# Patient Record
Sex: FEMALE | Race: WHITE | URBAN - METROPOLITAN AREA
[De-identification: names, ages, dates, MRNs, and addresses within clinical notes are randomized per-mention and may not be internally consistent; named-entity substitution may affect disease eponyms.]

---

## 2017-11-14 ENCOUNTER — OFFICE VISIT (OUTPATIENT)
Dept: OBGYN CLINIC | Facility: CLINIC | Age: 37
End: 2017-11-14

## 2017-11-14 VITALS
BODY MASS INDEX: 26 KG/M2 | DIASTOLIC BLOOD PRESSURE: 76 MMHG | SYSTOLIC BLOOD PRESSURE: 113 MMHG | HEART RATE: 58 BPM | WEIGHT: 179 LBS

## 2017-11-14 DIAGNOSIS — Z01.419 ENCOUNTER FOR GYNECOLOGICAL EXAMINATION: Primary | ICD-10-CM

## 2017-11-14 DIAGNOSIS — Z32.00 PREGNANCY EXAMINATION OR TEST, PREGNANCY UNCONFIRMED: ICD-10-CM

## 2017-11-14 PROCEDURE — 99395 PREV VISIT EST AGE 18-39: CPT | Performed by: OBSTETRICS & GYNECOLOGY

## 2017-11-16 NOTE — PROGRESS NOTES
Reva Garza is a 40year old female  No LMP recorded. Patient is not currently having periods (Reason: IUD - Intrauterine Device). here for annual exam.       MAF pt. Last pap 2016 normal with neg HPV.     Had Mirena IUD placed in 10/201 numbness. Psychiatric: denies depression or anxiety. Endocrine:   denies excessive thirst or urination. Heme/Lymph:  easy bruising or bleeding.     PHYSICAL EXAM:   /76   Pulse 58   Wt 179 lb (81.2 kg)   BMI 26.24 kg/m²   Wt Readings from Last 2 En

## 2017-11-27 ENCOUNTER — APPOINTMENT (OUTPATIENT)
Dept: LAB | Age: 37
End: 2017-11-27
Attending: OBSTETRICS & GYNECOLOGY
Payer: COMMERCIAL

## 2017-11-27 DIAGNOSIS — Z32.00 PREGNANCY EXAMINATION OR TEST, PREGNANCY UNCONFIRMED: ICD-10-CM

## 2017-11-27 PROCEDURE — 84703 CHORIONIC GONADOTROPIN ASSAY: CPT

## 2017-11-27 PROCEDURE — 36415 COLL VENOUS BLD VENIPUNCTURE: CPT

## 2017-12-01 ENCOUNTER — OFFICE VISIT (OUTPATIENT)
Dept: OBGYN CLINIC | Facility: CLINIC | Age: 37
End: 2017-12-01

## 2017-12-01 VITALS
WEIGHT: 179.19 LBS | SYSTOLIC BLOOD PRESSURE: 118 MMHG | BODY MASS INDEX: 26 KG/M2 | HEART RATE: 75 BPM | DIASTOLIC BLOOD PRESSURE: 71 MMHG

## 2017-12-01 DIAGNOSIS — Z30.433 ENCOUNTER FOR REMOVAL AND REINSERTION OF INTRAUTERINE CONTRACEPTIVE DEVICE: Primary | ICD-10-CM

## 2017-12-01 PROCEDURE — 58301 REMOVE INTRAUTERINE DEVICE: CPT | Performed by: OBSTETRICS & GYNECOLOGY

## 2017-12-01 PROCEDURE — 58300 INSERT INTRAUTERINE DEVICE: CPT | Performed by: OBSTETRICS & GYNECOLOGY

## 2021-01-11 ENCOUNTER — WALK IN (OUTPATIENT)
Dept: URGENT CARE | Age: 41
End: 2021-01-11

## 2021-01-11 ENCOUNTER — TELEPHONE (OUTPATIENT)
Dept: SCHEDULING | Age: 41
End: 2021-01-11

## 2021-01-11 VITALS
OXYGEN SATURATION: 100 % | SYSTOLIC BLOOD PRESSURE: 92 MMHG | WEIGHT: 175 LBS | RESPIRATION RATE: 16 BRPM | HEART RATE: 90 BPM | DIASTOLIC BLOOD PRESSURE: 60 MMHG | TEMPERATURE: 98.4 F | BODY MASS INDEX: 25.84 KG/M2

## 2021-01-11 DIAGNOSIS — L03.211 CELLULITIS OF FACE: Primary | ICD-10-CM

## 2021-01-11 PROCEDURE — 99203 OFFICE O/P NEW LOW 30 MIN: CPT | Performed by: NURSE PRACTITIONER

## 2021-01-11 RX ORDER — CEPHALEXIN 500 MG/1
500 CAPSULE ORAL 2 TIMES DAILY
Qty: 20 CAPSULE | Refills: 0 | Status: SHIPPED | OUTPATIENT
Start: 2021-01-11 | End: 2021-01-21

## 2021-01-11 RX ORDER — NEOMYCIN SULFATE, POLYMYXIN B SULFATE AND BACITRACIN ZINC 3.5; 10000; 4 MG/G; [USP'U]/G; [USP'U]/G
OINTMENT OPHTHALMIC
Qty: 3.5 G | Refills: 0 | Status: SHIPPED | OUTPATIENT
Start: 2021-01-11

## 2021-01-11 ASSESSMENT — ENCOUNTER SYMPTOMS: COLOR CHANGE: 1

## 2021-07-05 NOTE — PROCEDURES
IUD Removal     Consent signed. Procedure discussed with the patient in detail including indication, risks, benefits, alternatives and complications.     Pelvic Exam Findings:  Lesion description:  IUD strings seen from cervix    Procedure:  Speculum alberto No

## 2022-01-25 ENCOUNTER — OFFICE VISIT (OUTPATIENT)
Dept: OBGYN CLINIC | Facility: CLINIC | Age: 42
End: 2022-01-25
Payer: COMMERCIAL

## 2022-01-25 VITALS
SYSTOLIC BLOOD PRESSURE: 128 MMHG | WEIGHT: 191.63 LBS | HEIGHT: 69 IN | BODY MASS INDEX: 28.38 KG/M2 | HEART RATE: 80 BPM | DIASTOLIC BLOOD PRESSURE: 82 MMHG

## 2022-01-25 DIAGNOSIS — Z01.419 WELL WOMAN EXAM: Primary | ICD-10-CM

## 2022-01-25 DIAGNOSIS — Z12.4 SCREENING FOR MALIGNANT NEOPLASM OF CERVIX: ICD-10-CM

## 2022-01-25 DIAGNOSIS — Z12.31 SCREENING MAMMOGRAM, ENCOUNTER FOR: ICD-10-CM

## 2022-01-25 PROCEDURE — 3079F DIAST BP 80-89 MM HG: CPT | Performed by: OBSTETRICS & GYNECOLOGY

## 2022-01-25 PROCEDURE — 99386 PREV VISIT NEW AGE 40-64: CPT | Performed by: OBSTETRICS & GYNECOLOGY

## 2022-01-25 PROCEDURE — 3074F SYST BP LT 130 MM HG: CPT | Performed by: OBSTETRICS & GYNECOLOGY

## 2022-01-25 PROCEDURE — 3008F BODY MASS INDEX DOCD: CPT | Performed by: OBSTETRICS & GYNECOLOGY

## 2022-01-25 RX ORDER — NEOMYCIN SULFATE, POLYMYXIN B SULFATE AND BACITRACIN ZINC 3.5; 10000; 4 MG/G; [USP'U]/G; [USP'U]/G
OINTMENT OPHTHALMIC
COMMUNITY
Start: 2021-01-11

## 2022-01-25 NOTE — H&P
HPI:  The patient is a 38 yo F here for [employer]. Has mirena x 4 years. No menses. /monogamous. LPS:  6/23/16 pap/hpv neg      Reviewed medical and surgical history below     No LMP recorded. (Menstrual status: IUD - Intrauterine Device). Self-Exams: Not Asked    Social History Narrative      Not on file    Social Determinants of Health  Financial Resource Strain: Not on file  Food Insecurity: Not on file  Transportation Needs: Not on file  Physical Activity: Not on file  Stress: Not on fi no unusual rashes or bruises  Extremities: no edema, no cyanosis  Psychiatric: Appropriate mood and affect    Pelvic Exam:  External Genitalia: normal appearance, hair distribution, and no lesions  Urethral Meatus:  normal in size, location, without lesion

## 2022-01-26 LAB — HPV I/H RISK 1 DNA SPEC QL NAA+PROBE: NEGATIVE

## 2022-02-12 ENCOUNTER — HOSPITAL ENCOUNTER (OUTPATIENT)
Dept: MAMMOGRAPHY | Age: 42
Discharge: HOME | End: 2022-02-12
Attending: OBSTETRICS & GYNECOLOGY
Payer: COMMERCIAL

## 2022-02-12 DIAGNOSIS — Z12.31 SCREENING MAMMOGRAM, ENCOUNTER FOR: ICD-10-CM

## 2022-02-12 PROCEDURE — 77063 BREAST TOMOSYNTHESIS BI: CPT | Performed by: OBSTETRICS & GYNECOLOGY

## 2022-02-12 PROCEDURE — 77067 SCR MAMMO BI INCL CAD: CPT | Performed by: OBSTETRICS & GYNECOLOGY

## 2022-02-14 ENCOUNTER — TELEPHONE (OUTPATIENT)
Dept: OBGYN CLINIC | Facility: CLINIC | Age: 42
End: 2022-02-14

## 2022-02-24 ENCOUNTER — HOSPITAL ENCOUNTER (OUTPATIENT)
Dept: MAMMOGRAPHY | Facility: HOSPITAL | Age: 42
Discharge: HOME | End: 2022-02-24
Attending: OBSTETRICS & GYNECOLOGY
Payer: COMMERCIAL

## 2022-02-24 DIAGNOSIS — R92.8 ABNORMAL MAMMOGRAM: ICD-10-CM

## 2022-02-24 PROCEDURE — 77062 BREAST TOMOSYNTHESIS BI: CPT | Performed by: OBSTETRICS & GYNECOLOGY

## 2022-02-24 PROCEDURE — 77066 DX MAMMO INCL CAD BI: CPT | Performed by: OBSTETRICS & GYNECOLOGY

## 2023-01-06 ENCOUNTER — OFFICE VISIT (OUTPATIENT)
Dept: OBGYN CLINIC | Facility: CLINIC | Age: 43
End: 2023-01-06
Payer: COMMERCIAL

## 2023-01-06 VITALS
SYSTOLIC BLOOD PRESSURE: 123 MMHG | DIASTOLIC BLOOD PRESSURE: 75 MMHG | BODY MASS INDEX: 28 KG/M2 | WEIGHT: 191.63 LBS | HEART RATE: 87 BPM

## 2023-01-06 DIAGNOSIS — Z01.419 ENCOUNTER FOR GYNECOLOGICAL EXAMINATION: Primary | ICD-10-CM

## 2023-01-06 DIAGNOSIS — Z12.31 ENCOUNTER FOR SCREENING MAMMOGRAM FOR BREAST CANCER: ICD-10-CM

## 2023-01-06 PROCEDURE — 3074F SYST BP LT 130 MM HG: CPT | Performed by: OBSTETRICS & GYNECOLOGY

## 2023-01-06 PROCEDURE — 99396 PREV VISIT EST AGE 40-64: CPT | Performed by: OBSTETRICS & GYNECOLOGY

## 2023-01-06 PROCEDURE — 3078F DIAST BP <80 MM HG: CPT | Performed by: OBSTETRICS & GYNECOLOGY

## 2023-02-24 ENCOUNTER — HOSPITAL ENCOUNTER (OUTPATIENT)
Dept: MAMMOGRAPHY | Age: 43
Discharge: HOME | End: 2023-02-24
Attending: OBSTETRICS & GYNECOLOGY
Payer: COMMERCIAL

## 2023-02-24 DIAGNOSIS — Z12.31 ENCOUNTER FOR SCREENING MAMMOGRAM FOR BREAST CANCER: ICD-10-CM

## 2023-02-24 PROCEDURE — 77067 SCR MAMMO BI INCL CAD: CPT | Performed by: OBSTETRICS & GYNECOLOGY

## 2023-02-24 PROCEDURE — 77063 BREAST TOMOSYNTHESIS BI: CPT | Performed by: OBSTETRICS & GYNECOLOGY

## 2023-03-16 ENCOUNTER — HOSPITAL ENCOUNTER (OUTPATIENT)
Dept: ULTRASOUND IMAGING | Facility: HOSPITAL | Age: 43
Discharge: HOME | End: 2023-03-16
Attending: OBSTETRICS & GYNECOLOGY
Payer: COMMERCIAL

## 2023-03-16 ENCOUNTER — HOSPITAL ENCOUNTER (OUTPATIENT)
Dept: MAMMOGRAPHY | Facility: HOSPITAL | Age: 43
Discharge: HOME | End: 2023-03-16
Attending: OBSTETRICS & GYNECOLOGY
Payer: COMMERCIAL

## 2023-03-16 DIAGNOSIS — R92.8 ABNORMAL MAMMOGRAM: ICD-10-CM

## 2023-03-16 PROCEDURE — 77061 BREAST TOMOSYNTHESIS UNI: CPT | Performed by: OBSTETRICS & GYNECOLOGY

## 2023-03-16 PROCEDURE — 76642 ULTRASOUND BREAST LIMITED: CPT | Performed by: OBSTETRICS & GYNECOLOGY

## 2023-03-16 PROCEDURE — 77065 DX MAMMO INCL CAD UNI: CPT | Performed by: OBSTETRICS & GYNECOLOGY

## 2024-03-04 ENCOUNTER — OFFICE VISIT (OUTPATIENT)
Dept: OBGYN CLINIC | Facility: CLINIC | Age: 44
End: 2024-03-04
Payer: COMMERCIAL

## 2024-03-04 VITALS
WEIGHT: 196.19 LBS | SYSTOLIC BLOOD PRESSURE: 95 MMHG | BODY MASS INDEX: 29.06 KG/M2 | DIASTOLIC BLOOD PRESSURE: 65 MMHG | HEART RATE: 79 BPM | HEIGHT: 69 IN

## 2024-03-04 DIAGNOSIS — Z01.419 WELL WOMAN EXAM: Primary | ICD-10-CM

## 2024-03-04 DIAGNOSIS — Z12.31 SCREENING MAMMOGRAM, ENCOUNTER FOR: ICD-10-CM

## 2024-03-04 PROCEDURE — 99396 PREV VISIT EST AGE 40-64: CPT | Performed by: OBSTETRICS & GYNECOLOGY

## 2024-03-04 PROCEDURE — 3008F BODY MASS INDEX DOCD: CPT | Performed by: OBSTETRICS & GYNECOLOGY

## 2024-03-04 PROCEDURE — 3078F DIAST BP <80 MM HG: CPT | Performed by: OBSTETRICS & GYNECOLOGY

## 2024-03-04 PROCEDURE — 3074F SYST BP LT 130 MM HG: CPT | Performed by: OBSTETRICS & GYNECOLOGY

## 2024-03-04 RX ORDER — PREDNISONE 20 MG/1
TABLET ORAL DAILY
COMMUNITY
Start: 2023-09-07

## 2024-03-04 NOTE — PROGRESS NOTES
Chief Complaint   Patient presents with    Gyn Exam     ANNUAL EXAM, MAMMO ORDER       HPI:  The patient is a 44 yo F here for [employer].  Amenorrheic with Mirena placed in 2017.  /monogamous.  Kids are well . Needs mammo rx'ed    No LMP recorded. (Menstrual status: IUD - Intrauterine Device).        Latest Ref Rng & Units 2022     1:38 PM   RECENT PAP RESULTS   Thinprep Pap Negative for intraepithelial lesion or malignancy Negative for intraepithelial lesion or malignancy    HPV Negative Negative         Menarche: 14 or 15 years old   Period Cycle (Days): Absent due to Mirena   Use of Birth Control (if yes, specify type): Mirena IUD   Hx Prior Abnormal Pap: No  Pap Date: 22  Pap Result Notes: Neg Pap/HPV // Mammo 3/16/23 MAZIN BENIGN, 3/16/23 US L BENIGN      Chaperone: offered and declined      Depression Screening (PHQ-2/PHQ-9): Over the LAST 2 WEEKS   Little interest or pleasure in doing things (over the last two weeks)?: Not at all    Feeling down, depressed, or hopeless (over the last two weeks)?: Not at all    PHQ-2 SCORE: 0          Reviewed medical and surgical history below       OBSTETRICS HISTORY:  OB History    Para Term  AB Living   2 2 2 0 0 2   SAB IAB Ectopic Multiple Live Births   0 0 0 0 2       GYNE HISTORY:  History   Sexual Activity    Sexual activity: Yes    Partners: Male    Birth control/ protection: Mirena     Comment: same partner     Menarche: 14 or 15 years old   Period Cycle (Days): Absent due to Mirena   Use of Birth Control (if yes, specify type): Mirena IUD       MEDICAL HISTORY:  Past Medical History:   Diagnosis Date    History of chicken pox        SURGICAL HISTORY:  Past Surgical History:   Procedure Laterality Date    LAPAROSCOPIC CHOLECYSTECTOMY  2008      ,       SOCIAL HISTORY:  Social History     Socioeconomic History    Marital status:      Spouse name: Not on file    Number of children: Not on file    Years of education: Not on  file    Highest education level: Not on file   Occupational History    Not on file   Tobacco Use    Smoking status: Former    Smokeless tobacco: Never   Substance and Sexual Activity    Alcohol use: Yes     Alcohol/week: 0.0 standard drinks of alcohol     Comment: Social     Drug use: No    Sexual activity: Yes     Partners: Male     Birth control/protection: Mirena     Comment: same partner   Other Topics Concern     Service Not Asked    Blood Transfusions Not Asked    Caffeine Concern Not Asked     Comment: coffee/soda, 1cup/day    Occupational Exposure Not Asked    Hobby Hazards Not Asked    Sleep Concern Not Asked    Stress Concern Not Asked    Weight Concern Not Asked    Special Diet Not Asked    Back Care Not Asked    Exercise Not Asked    Bike Helmet Not Asked    Seat Belt Not Asked    Self-Exams Not Asked   Social History Narrative    Not on file     Social Determinants of Health     Financial Resource Strain: Not on file   Food Insecurity: Not on file   Transportation Needs: Not on file   Physical Activity: Not on file   Stress: Not on file   Social Connections: Not on file   Housing Stability: Not on file       FAMILY HISTORY:  Family History   Problem Relation Age of Onset    Hypertension Mother     Cancer Maternal Grandfather         lung - smoker/asbestos    Ear Problems Other         hearing loss (grandparents)    Breast Cancer Neg     Ovarian Cancer Neg        MEDICATIONS:    Current Outpatient Medications:     predniSONE 20 MG Oral Tab, Take by mouth daily. (Patient not taking: Reported on 3/4/2024), Disp: , Rfl:     Levonorgestrel 20 MCG/24HR Intrauterine IUD, 1 each by Intrauterine route once., Disp: , Rfl:     ALLERGIES:  No Known Allergies      Review of Systems:  Constitutional:  Denies fevers and chills   Cardiovascular:  denies chest pain or palpitations  Respiratory:  denies shortness of breath  Gastrointestinal:  denies heartburn, abdominal pain, diarrhea or  constipation  Genitourinary:  denies dysuria, incontinence, abnormal vaginal discharge, vaginal itching  Musculoskeletal:  denies back pain.  Skin/Breast:  Denies any breast pain, lumps, or discharge.   Neurological:  denies headaches, extremity weakness  Psychiatric: denies depression or anxiety.      Vitals:    03/04/24 0813   BP: 95/65   Pulse: 79       PHYSICAL EXAM:   Constitutional: well developed, well nourished  Head/Face: normocephalic  Neck/Thyroid: thyroid symmetric, no thyromegaly, no nodules, no adenopathy  Heart: Regular rate and rhythm   Lungs: clear to ascultation bilaterally   Lymphatic:no abnormal supraclavicular or axillary adenopathy is noted  Breast: normal without palpable masses, tenderness, asymmetry, nipple discharge, nipple retraction or skin changes  Abdomen:  soft, nontender, nondistended, no masses  Skin/Hair: no unusual rashes or bruises  Extremities: no edema, no cyanosis  Psychiatric: Appropriate mood and affect    Pelvic Exam:  External Genitalia: normal appearance, hair distribution, and no lesions  Urethral Meatus:  normal in size, location, without lesions and prolapse  Bladder:  No fullness, masses or tenderness  Vagina:  Normal appearance without lesions, no abnormal discharge  Cervix:  Normal without tenderness on motion; +strings  Uterus: normal in size, contour, position, mobility, without tenderness  Adnexa: normal without masses or tenderness  Perineum: normal    Assessment/Plan:  Erika was seen today for gyn exam.    Diagnoses and all orders for this visit:    Well woman exam    Screening mammogram, encounter for  -     [facility] TRACI 2D+3D SCREENING BILAT (CPT=77067/58072); Future        WWE:   Reviewed ASCCP guidelines with the patient -- Pap UTD  Contraception: Mirena---discussed 8 year use  Due for exchange Dec 2025  Breast Health:     Mammo rx'ed  Encouraged monthly self breast exams with the patient   Discussed diet, exercise, MVIs with Vit D  Follow up in 1 yr for  WWE

## 2024-03-21 ENCOUNTER — HOSPITAL ENCOUNTER (OUTPATIENT)
Dept: MAMMOGRAPHY | Age: 44
Discharge: HOME | End: 2024-03-21
Attending: OBSTETRICS & GYNECOLOGY
Payer: COMMERCIAL

## 2024-03-21 DIAGNOSIS — Z12.31 SCREENING MAMMOGRAM, ENCOUNTER FOR: ICD-10-CM

## 2024-03-21 PROCEDURE — 77067 SCR MAMMO BI INCL CAD: CPT | Performed by: OBSTETRICS & GYNECOLOGY

## 2024-03-21 PROCEDURE — 77063 BREAST TOMOSYNTHESIS BI: CPT | Performed by: OBSTETRICS & GYNECOLOGY

## 2024-05-09 ENCOUNTER — HOSPITAL ENCOUNTER (OUTPATIENT)
Dept: GENERAL RADIOLOGY | Age: 44
Discharge: HOME | End: 2024-05-09
Attending: NURSE PRACTITIONER
Payer: COMMERCIAL

## 2024-05-09 ENCOUNTER — LAB ENCOUNTER (OUTPATIENT)
Dept: LAB | Facility: REFERENCE LAB | Age: 44
End: 2024-05-09
Attending: NURSE PRACTITIONER
Payer: COMMERCIAL

## 2024-05-09 ENCOUNTER — OFFICE VISIT (OUTPATIENT)
Dept: FAMILY MEDICINE CLINIC | Facility: CLINIC | Age: 44
End: 2024-05-09
Payer: COMMERCIAL

## 2024-05-09 VITALS
HEIGHT: 69 IN | BODY MASS INDEX: 29.23 KG/M2 | SYSTOLIC BLOOD PRESSURE: 102 MMHG | OXYGEN SATURATION: 98 % | TEMPERATURE: 97 F | WEIGHT: 197.38 LBS | HEART RATE: 79 BPM | DIASTOLIC BLOOD PRESSURE: 80 MMHG

## 2024-05-09 DIAGNOSIS — J30.2 SEASONAL ALLERGIES: ICD-10-CM

## 2024-05-09 DIAGNOSIS — R05.3 CHRONIC COUGH: Primary | ICD-10-CM

## 2024-05-09 DIAGNOSIS — R05.3 CHRONIC COUGH: ICD-10-CM

## 2024-05-09 DIAGNOSIS — R06.2 WHEEZING: ICD-10-CM

## 2024-05-09 DIAGNOSIS — Z00.00 ADULT GENERAL MEDICAL EXAMINATION: ICD-10-CM

## 2024-05-09 PROBLEM — M54.42 CHRONIC LEFT-SIDED LOW BACK PAIN WITH LEFT-SIDED SCIATICA: Status: ACTIVE | Noted: 2019-03-11

## 2024-05-09 PROBLEM — G89.29 CHRONIC LEFT-SIDED LOW BACK PAIN WITH LEFT-SIDED SCIATICA: Status: ACTIVE | Noted: 2019-03-11

## 2024-05-09 LAB
ALBUMIN SERPL-MCNC: 4.4 G/DL (ref 3.2–4.8)
ALBUMIN/GLOB SERPL: 1.4 {RATIO} (ref 1–2)
ALP LIVER SERPL-CCNC: 67 U/L
ALT SERPL-CCNC: 16 U/L
ANION GAP SERPL CALC-SCNC: 8 MMOL/L (ref 0–18)
AST SERPL-CCNC: 22 U/L (ref ?–34)
BASOPHILS # BLD AUTO: 0.08 X10(3) UL (ref 0–0.2)
BASOPHILS NFR BLD AUTO: 1.2 %
BILIRUB SERPL-MCNC: 0.5 MG/DL (ref 0.3–1.2)
BUN BLD-MCNC: 8 MG/DL (ref 9–23)
BUN/CREAT SERPL: 10 (ref 10–20)
CALCIUM BLD-MCNC: 9.4 MG/DL (ref 8.7–10.4)
CHLORIDE SERPL-SCNC: 110 MMOL/L (ref 98–112)
CHOLEST SERPL-MCNC: 181 MG/DL (ref ?–200)
CO2 SERPL-SCNC: 25 MMOL/L (ref 21–32)
CREAT BLD-MCNC: 0.8 MG/DL
DEPRECATED RDW RBC AUTO: 44.5 FL (ref 35.1–46.3)
EGFRCR SERPLBLD CKD-EPI 2021: 94 ML/MIN/1.73M2 (ref 60–?)
EOSINOPHIL # BLD AUTO: 0.82 X10(3) UL (ref 0–0.7)
EOSINOPHIL NFR BLD AUTO: 12.6 %
ERYTHROCYTE [DISTWIDTH] IN BLOOD BY AUTOMATED COUNT: 14.5 % (ref 11–15)
EST. AVERAGE GLUCOSE BLD GHB EST-MCNC: 114 MG/DL (ref 68–126)
FASTING PATIENT LIPID ANSWER: NO
FASTING STATUS PATIENT QL REPORTED: NO
GLOBULIN PLAS-MCNC: 3.1 G/DL (ref 2–3.5)
GLUCOSE BLD-MCNC: 84 MG/DL (ref 70–99)
HBA1C MFR BLD: 5.6 % (ref ?–5.7)
HCT VFR BLD AUTO: 38.6 %
HDLC SERPL-MCNC: 46 MG/DL (ref 40–59)
HGB BLD-MCNC: 12.3 G/DL
IMM GRANULOCYTES # BLD AUTO: 0.01 X10(3) UL (ref 0–1)
IMM GRANULOCYTES NFR BLD: 0.2 %
LDLC SERPL CALC-MCNC: 117 MG/DL (ref ?–100)
LYMPHOCYTES # BLD AUTO: 1.86 X10(3) UL (ref 1–4)
LYMPHOCYTES NFR BLD AUTO: 28.5 %
MCH RBC QN AUTO: 26.9 PG (ref 26–34)
MCHC RBC AUTO-ENTMCNC: 31.9 G/DL (ref 31–37)
MCV RBC AUTO: 84.3 FL
MONOCYTES # BLD AUTO: 0.49 X10(3) UL (ref 0.1–1)
MONOCYTES NFR BLD AUTO: 7.5 %
NEUTROPHILS # BLD AUTO: 3.27 X10 (3) UL (ref 1.5–7.7)
NEUTROPHILS # BLD AUTO: 3.27 X10(3) UL (ref 1.5–7.7)
NEUTROPHILS NFR BLD AUTO: 50 %
NONHDLC SERPL-MCNC: 135 MG/DL (ref ?–130)
OSMOLALITY SERPL CALC.SUM OF ELEC: 294 MOSM/KG (ref 275–295)
PLATELET # BLD AUTO: 278 10(3)UL (ref 150–450)
POTASSIUM SERPL-SCNC: 4.2 MMOL/L (ref 3.5–5.1)
PROT SERPL-MCNC: 7.5 G/DL (ref 5.7–8.2)
RBC # BLD AUTO: 4.58 X10(6)UL
SODIUM SERPL-SCNC: 143 MMOL/L (ref 136–145)
TRIGL SERPL-MCNC: 98 MG/DL (ref 30–149)
TSI SER-ACNC: 3.39 MIU/ML (ref 0.55–4.78)
VLDLC SERPL CALC-MCNC: 17 MG/DL (ref 0–30)
WBC # BLD AUTO: 6.5 X10(3) UL (ref 4–11)

## 2024-05-09 PROCEDURE — 71046 X-RAY EXAM CHEST 2 VIEWS: CPT | Performed by: NURSE PRACTITIONER

## 2024-05-09 PROCEDURE — 80050 GENERAL HEALTH PANEL: CPT | Performed by: NURSE PRACTITIONER

## 2024-05-09 PROCEDURE — 86003 ALLG SPEC IGE CRUDE XTRC EA: CPT | Performed by: NURSE PRACTITIONER

## 2024-05-09 PROCEDURE — 80061 LIPID PANEL: CPT | Performed by: NURSE PRACTITIONER

## 2024-05-09 PROCEDURE — 83036 HEMOGLOBIN GLYCOSYLATED A1C: CPT | Performed by: NURSE PRACTITIONER

## 2024-05-09 PROCEDURE — 82785 ASSAY OF IGE: CPT | Performed by: NURSE PRACTITIONER

## 2024-05-09 PROCEDURE — 3074F SYST BP LT 130 MM HG: CPT | Performed by: NURSE PRACTITIONER

## 2024-05-09 PROCEDURE — 99204 OFFICE O/P NEW MOD 45 MIN: CPT | Performed by: NURSE PRACTITIONER

## 2024-05-09 PROCEDURE — 3079F DIAST BP 80-89 MM HG: CPT | Performed by: NURSE PRACTITIONER

## 2024-05-09 PROCEDURE — 3008F BODY MASS INDEX DOCD: CPT | Performed by: NURSE PRACTITIONER

## 2024-05-09 RX ORDER — ALBUTEROL SULFATE 90 UG/1
2 AEROSOL, METERED RESPIRATORY (INHALATION) EVERY 6 HOURS PRN
Qty: 1 EACH | Refills: 0 | Status: SHIPPED | OUTPATIENT
Start: 2024-05-09

## 2024-05-09 RX ORDER — PREDNISONE 20 MG/1
TABLET ORAL
Qty: 26 TABLET | Refills: 0 | Status: SHIPPED | OUTPATIENT
Start: 2024-05-09

## 2024-05-09 NOTE — PATIENT INSTRUCTIONS
-Claritin or Zyrtec 1 tablet daily over-the-counter x 2 weeks. Generic is OK.  -Flonase nasal spray 1 spray in each nostril twice daily x 2 weeks. Think \"nose to toes\" and rinse mouth after use. Generic is OK.

## 2024-05-09 NOTE — PROGRESS NOTES
Chief Complaint:   Chief Complaint   Patient presents with    Cough       HPI:   This is a 43 year old female coming in for chronic cough since August 2023. She saw a provider at that time who prescribed Prednisone and azithromycin but symptoms did not resolve. She thinks she may have gotten a bit better a few times over the past few months but cough always returns. Worse at night. Mostly non-productive. Feels wheezing, especially at bedtime. Has some tightness in chest during wheezing. No chest pain or dyspnea. Hx exercise-induced asthma as a teenager. Former smoker, >20 years ago. Also thinks she may have allergies. Symptoms do improve when she takes a Claritin.     Results for orders placed or performed in visit on 01/25/22   Hpv Dna  High Risk , Thin Prep Collect   Result Value Ref Range    HPV High Risk Negative Negative    HPV Source Cervical/endocervical    THINPREP PAP SMEAR ONLY   Result Value Ref Range    Interpretation/Result Negative for intraepithelial lesion or malignancy Negative for intraepithelial lesion or malignancy    Specimen Adequacy       Satisfactory for evaluation. Endocervical or metaplastic cells present    General Categorization Negative for intraepithelial lesion or malignancy       HPV High Risk mRNA       Negative   Normal       Recommendations/Comments       Screened by the Thinprep Imaging System and a cytotechnologist.      Embedded Images      Procedure       Monolayers:  1, specimen collected in Thinprep vial, 20 ml Preservcyt      Clinical Information       Z12.4 Screening For Malignant Neoplasm Of Cervix.         Reason for testing Screening     Gyn Additional Information       NOTE:  The Pap smear is a screening test that aids in the detection of cervical cancer and its precursors.  False negative and false positive results can occur. Regular sampling is recommended to minimize false negative results.      Case Report       Gynecologic Cytology                               Case: [id number]                                  Authorizing Provider:  Partha Aldridge DO     Collected:           2022 01:38 PM          Ordering Location:     [facility] for Received:            2022 11:52 AM                                 [facility], [facility]                                                                     First Screen:          Gladys Burns                                                               Specimen:    ThinPrep Imager Screening Pap, Cervical/endocervical                                                Past Medical History:    History of chicken pox     Past Surgical History:   Procedure Laterality Date    Laparoscopic cholecystectomy        ,     Social History:  Social History     Socioeconomic History    Marital status:    Tobacco Use    Smoking status: Former    Smokeless tobacco: Never   Substance and Sexual Activity    Alcohol use: Yes     Alcohol/week: 0.0 standard drinks of alcohol     Comment: Social     Drug use: No    Sexual activity: Yes     Partners: Male     Birth control/protection: Mirena     Comment: same partner     Social Determinants of Health     Financial Resource Strain: Low Risk  (2024)    Received from [facility]    Overall Financial Resource Strain (CARDIA)     Difficulty of Paying Living Expenses: Not hard at all   Food Insecurity: No Food Insecurity (2024)    Received from [facility]    Hunger Vital Sign     Worried About Running Out of Food in the Last Year: Never true     Ran Out of Food in the Last Year: Never true   Transportation Needs: No Transportation Needs (2024)    Received from [facility]    PRAPARE - Transportation     Lack of Transportation (Medical): No     Lack of Transportation (Non-Medical): No   Housing Stability:  Low Risk  (2/14/2024)    Received from [facility]    Housing Stability Vital Sign     Unable to Pay for Housing in the Last Year: No     Number of Places Lived in the Last Year: 1     In the last 12 months, was there a time when you did not have a steady place to sleep or slept in a shelter (including now)?: No     Family History:  Family History   Problem Relation Age of Onset    Hypertension Mother     Cancer Maternal Grandfather         lung - smoker/asbestos    Ear Problems Other         hearing loss (grandparents)    Breast Cancer Neg     Ovarian Cancer Neg      Allergies:  No Known Allergies  Current Meds:  Current Outpatient Medications   Medication Sig Dispense Refill    predniSONE 20 MG Oral Tab Take 3 tabs (60mg) daily x 4 days, then 2 tabs (40mg) daily x 4 days, then 1 tab (20mg) daily x 4 days, then 1/2 tab (10mg) daily x 4 days, then stop. 26 tablet 0    albuterol 108 (90 Base) MCG/ACT Inhalation Aero Soln Inhale 2 puffs into the lungs every 6 (six) hours as needed. 1 each 0      Counseling given: No       REVIEW OF SYSTEMS:   CONSTITUTIONAL:  Denies unusual weight gain/loss, fever, chills, or fatigue.  HEENT:  Eyes:  Denies eye pain, visual loss, blurred vision. Denies hearing loss, sneezing, congestion, runny nose or sore throat.  INTEGUMENTARY:  Denies rashes, itching, skin lesion.  CARDIOVASCULAR:  Denies chest pain, palpitations, edema, dyspnea on exertion or at rest.  RESPIRATORY:  See HPI.  GASTROINTESTINAL:  Denies abdominal pain, nausea, vomiting,  NEUROLOGICAL:  Denies headache, seizures, dizziness.    EXAM:   /80   Pulse 79   Temp 97.4 °F (36.3 °C)   Ht 5' 9\" (1.753 m)   Wt 197 lb 6.4 oz (89.5 kg)   SpO2 98%   BMI 29.15 kg/m²  Estimated body mass index is 29.15 kg/m² as calculated from the following:    Height as of this encounter: 5' 9\" (1.753 m).    Weight as of this encounter: 197 lb 6.4 oz (89.5 kg).   Vital signs reviewed.Appears stated age, well  groomed, in no acute distress.  Physical Exam:  GEN:  Patient is alert, awake and oriented, well developed, well nourished.  HEENT:  Head:  Normocephalic, atraumatic Eyes: EOMI, PERRLA, conjunctivae clear bilaterally, no eye discharge Ears: External normal, TM clear bilaterally. Nose: patent, no nasal discharge. Sounds congested on exam. Throat:  No tonsillar erythema or exudate.  Mouth:  No oral lesions, good dentition.  NECK: Supple, no CLAD, no thyromegaly.  SKIN: No rashes, no skin lesion, no bruising, good turgor.  HEART:  Regular rate and rhythm, no murmurs, rubs or gallops.  LUNGS: Scattered wheezing throughout lung fields. No crackles or rhonchi.  EXTREMITIES:  No edema.  NEURO:  No deficit, normal gait, strength and tone, sensory intact.    ASSESSMENT AND PLAN:   1. Chronic cough  -Consider asthma, post-nasal drip, GERD, other. Will check CXR as below.  -Due to wheezing, will prescribe prednisone with long taper. Instructed patient on how to take this medicine and possible side effects. See me in-person in 2 weeks.  -Albuterol inhaler every 4-6 hours PRN. Discussed how to take and possible side effects.   -To call office if symptoms worsening, to ER with severe symptoms like chest pain, dyspnea, etc.   - Allergens, Zone 8 [E]; Future  - predniSONE 20 MG Oral Tab; Take 3 tabs (60mg) daily x 4 days, then 2 tabs (40mg) daily x 4 days, then 1 tab (20mg) daily x 4 days, then 1/2 tab (10mg) daily x 4 days, then stop.  Dispense: 26 tablet; Refill: 0  - albuterol 108 (90 Base) MCG/ACT Inhalation Aero Soln; Inhale 2 puffs into the lungs every 6 (six) hours as needed.  Dispense: 1 each; Refill: 0  - XR CHEST PA + LAT CHEST (CPT=71046); Future    2. Wheezing  -See HPI.   - Allergens, Zone 8 [E]; Future  - predniSONE 20 MG Oral Tab; Take 3 tabs (60mg) daily x 4 days, then 2 tabs (40mg) daily x 4 days, then 1 tab (20mg) daily x 4 days, then 1/2 tab (10mg) daily x 4 days, then stop.  Dispense: 26 tablet; Refill: 0  -  albuterol 108 (90 Base) MCG/ACT Inhalation Aero Soln; Inhale 2 puffs into the lungs every 6 (six) hours as needed.  Dispense: 1 each; Refill: 0  - XR CHEST PA + LAT CHEST (CPT=71046); Future    3. Seasonal allergies  -Will check for common allergens.  -Recommended daily Claritin or Zyrtec x at least 2 weeks.   -Flonase BID x 2 weeks, instructed on how to use.   - Allergens, Zone 8 [E]; Future    4. Adult general medical examination  -Will check general labs.  - CBC With Differential With Platelet; Future  - Comp Metabolic Panel (14); Future  - Hemoglobin A1C; Future  - Lipid Panel; Future  - Assay, Thyroid Stim Hormone; Future      Meds & Refills for this Visit:  Requested Prescriptions     Signed Prescriptions Disp Refills    predniSONE 20 MG Oral Tab 26 tablet 0     Sig: Take 3 tabs (60mg) daily x 4 days, then 2 tabs (40mg) daily x 4 days, then 1 tab (20mg) daily x 4 days, then 1/2 tab (10mg) daily x 4 days, then stop.    albuterol 108 (90 Base) MCG/ACT Inhalation Aero Soln 1 each 0     Sig: Inhale 2 puffs into the lungs every 6 (six) hours as needed.         Problem List:  Patient Active Problem List   Diagnosis    Chronic left-sided low back pain with left-sided sciatica       Kimberley Lucas, JAIME  5/9/2024  9:47 AM

## 2024-05-14 LAB
A ALTERNATA IGE QN: 1.1 KUA/L (ref ?–0.1)
A FUMIGATUS IGE QN: <0.1 KUA/L (ref ?–0.1)
AMER SYCAMORE IGE QN: <0.1 KUA/L (ref ?–0.1)
BERMUDA GRASS IGE QN: <0.1 KUA/L (ref ?–0.1)
BOXELDER IGE QN: <0.1 KUA/L (ref ?–0.1)
C HERBARUM IGE QN: 0.23 KUA/L (ref ?–0.1)
CALIF WALNUT IGE QN: <0.1 KUA/L (ref ?–0.1)
CAT DANDER IGE QN: 0.57 KUA/L (ref ?–0.1)
CMN PIGWEED IGE QN: <0.1 KUA/L (ref ?–0.1)
COMMON RAGWEED IGE QN: <0.1 KUA/L (ref ?–0.1)
COTTONWOOD IGE QN: <0.1 KUA/L (ref ?–0.1)
D FARINAE IGE QN: 0.95 KUA/L (ref ?–0.1)
D PTERONYSS IGE QN: 0.74 KUA/L (ref ?–0.1)
DOG DANDER IGE QN: <0.1 KUA/L (ref ?–0.1)
IGE SERPL-ACNC: 102 KU/L (ref 2–214)
M RACEMOSUS IGE QN: <0.1 KUA/L (ref ?–0.1)
MARSH ELDER IGE QN: 0.12 KUA/L (ref ?–0.1)
MOUSE EPITH IGE QN: <0.1 KUA/L (ref ?–0.1)
MT JUNIPER IGE QN: <0.1 KUA/L (ref ?–0.1)
P NOTATUM IGE QN: <0.1 KUA/L (ref ?–0.1)
PECAN/HICK TREE IGE QN: <0.1 KUA/L (ref ?–0.1)
ROACH IGE QN: 0.11 KUA/L (ref ?–0.1)
SALTWORT IGE QN: <0.1 KUA/L (ref ?–0.1)
SILVER BIRCH IGE QN: <0.1 KUA/L (ref ?–0.1)
TIMOTHY IGE QN: <0.1 KUA/L (ref ?–0.1)
WHITE ASH IGE QN: <0.1 KUA/L (ref ?–0.1)
WHITE ELM IGE QN: <0.1 KUA/L (ref ?–0.1)
WHITE MULBERRY IGE QN: <0.1 KUA/L (ref ?–0.1)
WHITE OAK IGE QN: <0.1 KUA/L (ref ?–0.1)

## 2024-05-15 DIAGNOSIS — R79.89 ABNORMAL CBC: Primary | ICD-10-CM

## 2024-05-23 ENCOUNTER — OFFICE VISIT (OUTPATIENT)
Dept: FAMILY MEDICINE CLINIC | Facility: CLINIC | Age: 44
End: 2024-05-23

## 2024-05-23 VITALS
BODY MASS INDEX: 30.04 KG/M2 | DIASTOLIC BLOOD PRESSURE: 80 MMHG | OXYGEN SATURATION: 98 % | SYSTOLIC BLOOD PRESSURE: 130 MMHG | WEIGHT: 202.81 LBS | HEART RATE: 68 BPM | TEMPERATURE: 98 F | HEIGHT: 69 IN

## 2024-05-23 DIAGNOSIS — R06.2 WHEEZING: ICD-10-CM

## 2024-05-23 DIAGNOSIS — R05.3 CHRONIC COUGH: Primary | ICD-10-CM

## 2024-05-23 PROCEDURE — 3075F SYST BP GE 130 - 139MM HG: CPT | Performed by: NURSE PRACTITIONER

## 2024-05-23 PROCEDURE — 3008F BODY MASS INDEX DOCD: CPT | Performed by: NURSE PRACTITIONER

## 2024-05-23 PROCEDURE — 3079F DIAST BP 80-89 MM HG: CPT | Performed by: NURSE PRACTITIONER

## 2024-05-23 PROCEDURE — 99213 OFFICE O/P EST LOW 20 MIN: CPT | Performed by: NURSE PRACTITIONER

## 2024-05-23 NOTE — PROGRESS NOTES
Chief Complaint:   Chief Complaint   Patient presents with    Follow - Up     Cough        HPI:   This is a 43 year old female coming in for follow-up on cough/wheezing. She completed her prednisone taper yesterday. Feels much better-no longer coughing, no chest tightness or wheezing. She was using the albuterol more often the first few days on steroids but has not needed it lately. Feels normal now.    Results for orders placed or performed in visit on 05/09/24   Comp Metabolic Panel (14)   Result Value Ref Range    Glucose 84 70 - 99 mg/dL    Sodium 143 136 - 145 mmol/L    Potassium 4.2 3.5 - 5.1 mmol/L    Chloride 110 98 - 112 mmol/L    CO2 25.0 21.0 - 32.0 mmol/L    Anion Gap 8 0 - 18 mmol/L    BUN 8 (L) 9 - 23 mg/dL    Creatinine 0.80 0.55 - 1.02 mg/dL    BUN/CREA Ratio 10.0 10.0 - 20.0    Calcium, Total 9.4 8.7 - 10.4 mg/dL    Calculated Osmolality 294 275 - 295 mOsm/kg    eGFR-Cr 94 >=60 mL/min/1.73m2    ALT 16 10 - 49 U/L    AST 22 <=34 U/L    Alkaline Phosphatase 67 37 - 98 U/L    Bilirubin, Total 0.5 0.3 - 1.2 mg/dL    Total Protein 7.5 5.7 - 8.2 g/dL    Albumin 4.4 3.2 - 4.8 g/dL    Globulin  3.1 2.0 - 3.5 g/dL    A/G Ratio 1.4 1.0 - 2.0    Patient Fasting for CMP? No    Hemoglobin A1C   Result Value Ref Range    HgbA1C 5.6 <5.7 %    Estimated Average Glucose 114 68 - 126 mg/dL   Lipid Panel   Result Value Ref Range    Cholesterol, Total 181 <200 mg/dL    HDL Cholesterol 46 40 - 59 mg/dL    Triglycerides 98 30 - 149 mg/dL    LDL Cholesterol 117 (H) <100 mg/dL    VLDL 17 0 - 30 mg/dL    Non HDL Chol 135 (H) <130 mg/dL    Patient Fasting for Lipid? No    Assay, Thyroid Stim Hormone   Result Value Ref Range    TSH 3.393 0.550 - 4.780 mIU/mL   Allergens, Zone 8 [E]   Result Value Ref Range    Allergen A. Alternata 1.10 (H) <0.10 kUA/L    Allergen Aspergillus Fumigatus <0.10 <0.10 kUA/L    Allergen [place] Grass <0.10 <0.10 kUA/L    Allergen [place] <0.10 <0.10 kUA/L    Allergen Box Elder <0.10 <0.10 kUA/L     Allergen C. Herbarum 0.23 (H) <0.10 kUA/L    Allergen Cat Dander 0.57 (H) <0.10 kUA/L    Allergen Cockroach 0.11 (H) <0.10 kUA/L    Allergen Common Pigweed <0.10 <0.10 kUA/L    Allergen Common Ragweed <0.10 <0.10 kUA/L    Allergen [place] Tree <0.10 <0.10 kUA/L    Allergen D. Farinae 0.95 (H) <0.10 kUA/L    Allergen D. Pteronyssinus 0.74 (H) <0.10 kUA/L    Allergen Dog Dander <0.10 <0.10 kUA/L    Allergen Elm Tree <0.10 <0.10 kUA/L    Allergen Marsh Elder 0.12 (H) <0.10 kUA/L    Allergen Mouse Epithelium <0.10 <0.10 kUA/L    Allergen [place] <0.10 <0.10 kUA/L    Allergen Mucor Racemosus <0.10 <0.10 kUA/L    Allergen [place] <0.10 <0.10 kUA/L    Allergen [place] Tree <0.10 <0.10 kUA/L    Allergen Pecan/Hickory <0.10 <0.10 kUA/L    Allergen Penicillium Notatum <0.10 <0.10 kUA/L    Allergen Russian Thistle <0.10 <0.10 kUA/L    Allergen [place] <0.10 <0.10 kUA/L    Allergen [place] Grass <0.10 <0.10 kUA/L    Allergen [place] Tree <0.10 <0.10 kUA/L    Allergen [place] <0.10 <0.10 kUA/L    Immunoglobulin E 102.00 2.00 - 214.00 kU/L   CBC W/ DIFFERENTIAL   Result Value Ref Range    WBC 6.5 4.0 - 11.0 x10(3) uL    RBC 4.58 3.80 - 5.30 x10(6)uL    HGB 12.3 12.0 - 16.0 g/dL    HCT 38.6 35.0 - 48.0 %    MCV 84.3 80.0 - 100.0 fL    MCH 26.9 26.0 - 34.0 pg    MCHC 31.9 31.0 - 37.0 g/dL    RDW-SD 44.5 35.1 - 46.3 fL    RDW 14.5 11.0 - 15.0 %    .0 150.0 - 450.0 10(3)uL    Neutrophil Absolute Prelim 3.27 1.50 - 7.70 x10 (3) uL    Neutrophil Absolute 3.27 1.50 - 7.70 x10(3) uL    Lymphocyte Absolute 1.86 1.00 - 4.00 x10(3) uL    Monocyte Absolute 0.49 0.10 - 1.00 x10(3) uL    Eosinophil Absolute 0.82 (H) 0.00 - 0.70 x10(3) uL    Basophil Absolute 0.08 0.00 - 0.20 x10(3) uL    Immature Granulocyte Absolute 0.01 0.00 - 1.00 x10(3) uL    Neutrophil % 50.0 %    Lymphocyte % 28.5 %    Monocyte % 7.5 %    Eosinophil % 12.6 %    Basophil % 1.2 %    Immature Granulocyte % 0.2 %             Past Medical History:     History of chicken pox     Past Surgical History:   Procedure Laterality Date    Laparoscopic cholecystectomy  2008      ,     Social History:  Social History     Socioeconomic History    Marital status:    Tobacco Use    Smoking status: Former    Smokeless tobacco: Never   Substance and Sexual Activity    Alcohol use: Yes     Alcohol/week: 0.0 standard drinks of alcohol     Comment: Social     Drug use: No    Sexual activity: Yes     Partners: Male     Birth control/protection: Mirena     Comment: same partner     Social Determinants of Health     Financial Resource Strain: Low Risk  (2024)    [facility], [facility]    Overall Financial Resource Strain (CARDIA)     Difficulty of Paying Living Expenses: Not hard at all   Food Insecurity: No Food Insecurity (2024)    [facility] from [facility], [facility]    Hunger Vital Sign     Worried About Running Out of Food in the Last Year: Never true     Ran Out of Food in the Last Year: Never true   Transportation Needs: No Transportation Needs (2024)    Received from [facility], [facility]    PRAPARE - Transportation     Lack of Transportation (Medical): No     Lack of Transportation (Non-Medical): No   Housing Stability: Low Risk  (2024)    [facility] from [facility], [facility]    Housing Stability Vital Sign     Unable to Pay for Housing in the Last Year: No     Number of Places Lived in the Last Year: 1     In the last 12 months, was there a time when you did not have a steady place to sleep or slept in a shelter (including now)?: No     Family History:  Family History   Problem Relation Age of Onset    Hypertension Mother     Cancer Maternal Grandfather         lung - smoker/asbestos    Ear Problems Other         hearing loss (grandparents)     Breast Cancer Neg     Ovarian Cancer Neg      Allergies:  No Known Allergies  Current Meds:  Current Outpatient Medications   Medication Sig Dispense Refill    predniSONE 20 MG Oral Tab Take 3 tabs (60mg) daily x 4 days, then 2 tabs (40mg) daily x 4 days, then 1 tab (20mg) daily x 4 days, then 1/2 tab (10mg) daily x 4 days, then stop. 26 tablet 0    albuterol 108 (90 Base) MCG/ACT Inhalation Aero Soln Inhale 2 puffs into the lungs every 6 (six) hours as needed. 1 each 0      Counseling given: No       REVIEW OF SYSTEMS:   CONSTITUTIONAL:  Denies unusual weight gain/loss, fever, chills, or fatigue.  HEENT:  Seasonal allergies-taking Claritin daily.  INTEGUMENTARY:  Denies rashes, itching, skin lesion.  CARDIOVASCULAR:  Denies chest pain, palpitations, edema, dyspnea on exertion or at rest.  RESPIRATORY:  Denies shortness of breath, wheezing, cough or sputum.  NEUROLOGICAL:  Denies headache, seizures, dizziness.    EXAM:   /80   Pulse 68   Temp 98.3 °F (36.8 °C)   Ht 5' 9\" (1.753 m)   Wt 202 lb 12.8 oz (92 kg)   SpO2 98%   BMI 29.95 kg/m²  Estimated body mass index is 29.95 kg/m² as calculated from the following:    Height as of this encounter: 5' 9\" (1.753 m).    Weight as of this encounter: 202 lb 12.8 oz (92 kg).   Vital signs reviewed.Appears stated age, well groomed, in no acute distress.  Physical Exam:  GEN:  Patient is alert, awake and oriented, well developed, well nourished.  SKIN: No rashes, no skin lesion, no bruising, good turgor.  HEART:  Regular rate and rhythm, no murmurs, rubs or gallops.  LUNGS: Clear to auscultation bilterally, no rales/rhonchi/wheezing.  NEURO:  No deficit, normal gait, strength and tone, sensory intact.    ASSESSMENT AND PLAN:   1. Chronic cough  -Resolved. Consider RAD/possible asthma. May use albuterol inhaler PRN if symptoms flare. If she is needing the inhaler multiple times per week, multiple times per day, or waking up at night with symptoms should follow-up for  possible daily medication.     2. Wheezing  -Resolved.      Meds & Refills for this Visit:  Requested Prescriptions      No prescriptions requested or ordered in this encounter         Problem List:  Patient Active Problem List   Diagnosis    Chronic left-sided low back pain with left-sided sciatica       Kimberley Lucas, JAIME  5/23/2024  9:27 AM

## 2024-06-28 DIAGNOSIS — R06.2 WHEEZING: ICD-10-CM

## 2024-06-28 DIAGNOSIS — R05.3 CHRONIC COUGH: ICD-10-CM

## 2024-07-01 RX ORDER — ALBUTEROL SULFATE 90 UG/1
2 AEROSOL, METERED RESPIRATORY (INHALATION) EVERY 6 HOURS PRN
Qty: 1 EACH | Refills: 0 | Status: SHIPPED | OUTPATIENT
Start: 2024-07-01

## 2024-07-01 NOTE — TELEPHONE ENCOUNTER
A refill request was received for:  Requested Prescriptions     Pending Prescriptions Disp Refills    albuterol 108 (90 Base) MCG/ACT Inhalation Aero Soln 1 each 0     Sig: Inhale 2 puffs into the lungs every 6 (six) hours as needed.     Last refill date:  05/09/2024  Qty: 1 - No refills  Dx: Chronic Cough  Last office visit: 05/23/2024  When is follow up due: 08/23/2024      Future Appointments   Date Time Provider Department Center   7/8/2024  8:30 AM Kimberley Lucas APRN [id number] [place]   9/25/2024  8:00 AM Carlos Lai MD [facility]

## 2024-07-08 ENCOUNTER — OFFICE VISIT (OUTPATIENT)
Dept: FAMILY MEDICINE CLINIC | Facility: CLINIC | Age: 44
End: 2024-07-08
Payer: COMMERCIAL

## 2024-07-08 VITALS
TEMPERATURE: 97 F | HEIGHT: 69 IN | BODY MASS INDEX: 30.21 KG/M2 | DIASTOLIC BLOOD PRESSURE: 70 MMHG | HEART RATE: 79 BPM | WEIGHT: 204 LBS | OXYGEN SATURATION: 94 % | SYSTOLIC BLOOD PRESSURE: 110 MMHG

## 2024-07-08 DIAGNOSIS — R05.3 CHRONIC COUGH: ICD-10-CM

## 2024-07-08 DIAGNOSIS — J45.41 MODERATE PERSISTENT ASTHMA WITH ACUTE EXACERBATION (HCC): Primary | ICD-10-CM

## 2024-07-08 PROCEDURE — 3074F SYST BP LT 130 MM HG: CPT | Performed by: NURSE PRACTITIONER

## 2024-07-08 PROCEDURE — 3008F BODY MASS INDEX DOCD: CPT | Performed by: NURSE PRACTITIONER

## 2024-07-08 PROCEDURE — 99213 OFFICE O/P EST LOW 20 MIN: CPT | Performed by: NURSE PRACTITIONER

## 2024-07-08 PROCEDURE — 3078F DIAST BP <80 MM HG: CPT | Performed by: NURSE PRACTITIONER

## 2024-07-08 RX ORDER — MONTELUKAST SODIUM 10 MG/1
10 TABLET ORAL NIGHTLY
Qty: 90 TABLET | Refills: 1 | Status: SHIPPED | OUTPATIENT
Start: 2024-07-08

## 2024-07-08 RX ORDER — BUDESONIDE AND FORMOTEROL FUMARATE DIHYDRATE 80; 4.5 UG/1; UG/1
2 AEROSOL RESPIRATORY (INHALATION) 2 TIMES DAILY
Qty: 1 EACH | Refills: 3 | Status: SHIPPED | OUTPATIENT
Start: 2024-07-08

## 2024-07-08 RX ORDER — PREDNISONE 20 MG/1
TABLET ORAL
Qty: 20 TABLET | Refills: 0 | Status: SHIPPED | OUTPATIENT
Start: 2024-07-08

## 2024-07-08 NOTE — PROGRESS NOTES
Chief Complaint:   Chief Complaint   Patient presents with    Cough       HPI:   This is a 43 year old female coming in for return of cough and wheezing. She initially had a chronic cough starting in August 2023, this improved after a long taper of Prednisone and albuterol PRN in May. Cough returned around 6/21 when she went to [place] on vacation. She feels wheezing often.  Has some shortness of breath at times. Waking at night with symptoms. Cough is usually non-productive, occasionally wetter. No fever, chills, malaise. Using her albuterol 8-9 times per day, and this helps for a bit. Continues on Claritin, uses Flonase occasionally.     Results for orders placed or performed in visit on 05/09/24   Comp Metabolic Panel (14)   Result Value Ref Range    Glucose 84 70 - 99 mg/dL    Sodium 143 136 - 145 mmol/L    Potassium 4.2 3.5 - 5.1 mmol/L    Chloride 110 98 - 112 mmol/L    CO2 25.0 21.0 - 32.0 mmol/L    Anion Gap 8 0 - 18 mmol/L    BUN 8 (L) 9 - 23 mg/dL    Creatinine 0.80 0.55 - 1.02 mg/dL    BUN/CREA Ratio 10.0 10.0 - 20.0    Calcium, Total 9.4 8.7 - 10.4 mg/dL    Calculated Osmolality 294 275 - 295 mOsm/kg    eGFR-Cr 94 >=60 mL/min/1.73m2    ALT 16 10 - 49 U/L    AST 22 <=34 U/L    Alkaline Phosphatase 67 37 - 98 U/L    Bilirubin, Total 0.5 0.3 - 1.2 mg/dL    Total Protein 7.5 5.7 - 8.2 g/dL    Albumin 4.4 3.2 - 4.8 g/dL    Globulin  3.1 2.0 - 3.5 g/dL    A/G Ratio 1.4 1.0 - 2.0    Patient Fasting for CMP? No    Hemoglobin A1C   Result Value Ref Range    HgbA1C 5.6 <5.7 %    Estimated Average Glucose 114 68 - 126 mg/dL   Lipid Panel   Result Value Ref Range    Cholesterol, Total 181 <200 mg/dL    HDL Cholesterol 46 40 - 59 mg/dL    Triglycerides 98 30 - 149 mg/dL    LDL Cholesterol 117 (H) <100 mg/dL    VLDL 17 0 - 30 mg/dL    Non HDL Chol 135 (H) <130 mg/dL    Patient Fasting for Lipid? No    Assay, Thyroid Stim Hormone   Result Value Ref Range    TSH 3.393 0.550 - 4.780 mIU/mL   Allergens, Zone 8 [E]    Result Value Ref Range    Allergen A. Alternata 1.10 (H) <0.10 kUA/L    Allergen Aspergillus Fumigatus <0.10 <0.10 kUA/L    Allergen [place] Grass <0.10 <0.10 kUA/L    Allergen [place] <0.10 <0.10 kUA/L    Allergen Box Elder <0.10 <0.10 kUA/L    Allergen C. Herbarum 0.23 (H) <0.10 kUA/L    Allergen Cat Dander 0.57 (H) <0.10 kUA/L    Allergen Cockroach 0.11 (H) <0.10 kUA/L    Allergen Common Pigweed <0.10 <0.10 kUA/L    Allergen Common Ragweed <0.10 <0.10 kUA/L    Allergen [place] Tree <0.10 <0.10 kUA/L    Allergen D. Farinae 0.95 (H) <0.10 kUA/L    Allergen D. Pteronyssinus 0.74 (H) <0.10 kUA/L    Allergen Dog Dander <0.10 <0.10 kUA/L    Allergen [place] Tree <0.10 <0.10 kUA/L    Allergen [place] Elder 0.12 (H) <0.10 kUA/L    Allergen Mouse Epithelium <0.10 <0.10 kUA/L    Allergen [place] <0.10 <0.10 kUA/L    Allergen Mucor Racemosus <0.10 <0.10 kUA/L    Allergen [place] <0.10 <0.10 kUA/L    Allergen [place] Tree <0.10 <0.10 kUA/L    Allergen Pecan/Hickory <0.10 <0.10 kUA/L    Allergen Penicillium Notatum <0.10 <0.10 kUA/L    Allergen Russian Thistle <0.10 <0.10 kUA/L    Allergen [place] <0.10 <0.10 kUA/L    Allergen [place] Grass <0.10 <0.10 kUA/L    Allergen [place] Tree <0.10 <0.10 kUA/L    Allergen [place] <0.10 <0.10 kUA/L    Immunoglobulin E 102.00 2.00 - 214.00 kU/L   CBC W/ DIFFERENTIAL   Result Value Ref Range    WBC 6.5 4.0 - 11.0 x10(3) uL    RBC 4.58 3.80 - 5.30 x10(6)uL    HGB 12.3 12.0 - 16.0 g/dL    HCT 38.6 35.0 - 48.0 %    MCV 84.3 80.0 - 100.0 fL    MCH 26.9 26.0 - 34.0 pg    MCHC 31.9 31.0 - 37.0 g/dL    RDW-SD 44.5 35.1 - 46.3 fL    RDW 14.5 11.0 - 15.0 %    .0 150.0 - 450.0 10(3)uL    Neutrophil Absolute Prelim 3.27 1.50 - 7.70 x10 (3) uL    Neutrophil Absolute 3.27 1.50 - 7.70 x10(3) uL    Lymphocyte Absolute 1.86 1.00 - 4.00 x10(3) uL    Monocyte Absolute 0.49 0.10 - 1.00 x10(3) uL    Eosinophil Absolute 0.82 (H) 0.00 - 0.70 x10(3) uL    Basophil Absolute 0.08 0.00 - 0.20 x10(3)  uL    Immature Granulocyte Absolute 0.01 0.00 - 1.00 x10(3) uL    Neutrophil % 50.0 %    Lymphocyte % 28.5 %    Monocyte % 7.5 %    Eosinophil % 12.6 %    Basophil % 1.2 %    Immature Granulocyte % 0.2 %             Past Medical History:    History of chicken pox     Past Surgical History:   Procedure Laterality Date    Laparoscopic cholecystectomy        ,     Social History:  Social History     Socioeconomic History    Marital status:    Tobacco Use    Smoking status: Former    Smokeless tobacco: Never   Vaping Use    Vaping status: Never Used   Substance and Sexual Activity    Alcohol use: Yes     Alcohol/week: 0.0 standard drinks of alcohol     Comment: Social     Drug use: No    Sexual activity: Yes     Partners: Male     Birth control/protection: Mirena     Comment: same partner   Other Topics Concern    Caffeine Concern No     Comment: coffee/soda, 1cup/day    Stress Concern No    Weight Concern No    Special Diet No    Exercise No    Seat Belt No     Social Determinants of Health     Financial Resource Strain: Low Risk  (2024)    [facility], [facility]    Overall Financial Resource Strain (CARDIA)     Difficulty of Paying Living Expenses: Not hard at all   Food Insecurity: No Food Insecurity (2024)    [facility] from [facility], [facility]    Hunger Vital Sign     Worried About Running Out of Food in the Last Year: Never true     Ran Out of Food in the Last Year: Never true   Transportation Needs: No Transportation Needs (2024)    Received from [facility], [facility]    PRAPARE - Transportation     Lack of Transportation (Medical): No     Lack of Transportation (Non-Medical): No   Housing Stability: Low Risk  (2024)    Received from [facility], [facility]    Housing Stability Vital  Sign     Unable to Pay for Housing in the Last Year: No     Number of Places Lived in the Last Year: 1     In the last 12 months, was there a time when you did not have a steady place to sleep or slept in a shelter (including now)?: No     Family History:  Family History   Problem Relation Age of Onset    Hypertension Mother     Cancer Maternal Grandfather         lung - smoker/asbestos    Ear Problems Other         hearing loss (grandparents)    Breast Cancer Neg     Ovarian Cancer Neg      Allergies:  No Known Allergies  Current Meds:  Current Outpatient Medications   Medication Sig Dispense Refill    montelukast 10 MG Oral Tab Take 1 tablet (10 mg total) by mouth nightly. 90 tablet 1    Budesonide-Formoterol Fumarate (SYMBICORT) 80-4.5 MCG/ACT Inhalation Aerosol Inhale 2 puffs into the lungs 2 (two) times daily. 1 each 3    predniSONE 20 MG Oral Tab Take 3 tabs (60mg) daily x 3 days, then 2 tabs (40mg) daily x 3 days, then 1 tab (20mg) daily x 3 days, then 1/2 tab (10mg) daily x 3 days, then stop. 20 tablet 0    albuterol 108 (90 Base) MCG/ACT Inhalation Aero Soln Inhale 2 puffs into the lungs every 6 (six) hours as needed. 1 each 0      Counseling given: Not Answered       REVIEW OF SYSTEMS:   CONSTITUTIONAL:  Denies unusual weight gain/loss, fever, chills, or fatigue.  HEENT:  Eyes:  Denies eye pain, visual loss, blurred vision. Denies hearing loss, sneezing, congestion, runny nose or sore throat.  INTEGUMENTARY:  Denies rashes, itching, skin lesion.  CARDIOVASCULAR:  Denies chest pain, palpitations, edema, dyspnea on exertion or at rest.  RESPIRATORY: See HPI.  NEUROLOGICAL:  Denies headache, seizures, dizziness.    EXAM:   /70 (BP Location: Left arm, Patient Position: Sitting, Cuff Size: adult)   Pulse 79   Temp 96.9 °F (36.1 °C) (Temporal)   Ht 5' 9\" (1.753 m)   Wt 204 lb (92.5 kg)   SpO2 94%   BMI 30.13 kg/m²  Estimated body mass index is 30.13 kg/m² as calculated from the following:    Height  as of this encounter: 5' 9\" (1.753 m).    Weight as of this encounter: 204 lb (92.5 kg).   Vital signs reviewed.Appears stated age, well groomed, in no acute distress.  Physical Exam:  GEN:  Patient is alert, awake and oriented, well developed, well nourished.  HEENT:  Head:  Normocephalic, atraumatic Eyes: EOMI, PERRLA, conjunctivae clear bilaterally, no eye discharge Ears: External normal, TM clear bilaterally. Nose: patent, no nasal discharge. Throat:  No tonsillar erythema or exudate.  Mouth:  No oral lesions, good dentition.  SKIN: No rashes, no skin lesion, no bruising, good turgor.  HEART:  Regular rate and rhythm, no murmurs, rubs or gallops.  LUNGS: Faint expiratory wheezes throughout. No rhonchi or rales.  NEURO:  No deficit, normal gait, strength and tone, sensory intact.    ASSESSMENT AND PLAN:   1. Moderate persistent asthma with acute exacerbation (HCC)  -Will restart prednisone taper. Discussed how to use and possible side effects.   -Will trial Symbicort BID. Discussed how to use this and possible side effects. Rinse mouth after each use. May continue albuterol PRN with the goal that she will need this less often.  -Will trial Singulair at bedtime. Discussed possible side effects.   -Continue Claritin and Flonase. Seeing allergist in September.  -Avoid triggers.  -See me in-person 4 weeks. If symptoms do not improve or return, should see me sooner. To ER with worsening symptoms or fever/chills.   - montelukast 10 MG Oral Tab; Take 1 tablet (10 mg total) by mouth nightly.  Dispense: 90 tablet; Refill: 1  - Budesonide-Formoterol Fumarate (SYMBICORT) 80-4.5 MCG/ACT Inhalation Aerosol; Inhale 2 puffs into the lungs 2 (two) times daily.  Dispense: 1 each; Refill: 3  - predniSONE 20 MG Oral Tab; Take 3 tabs (60mg) daily x 3 days, then 2 tabs (40mg) daily x 3 days, then 1 tab (20mg) daily x 3 days, then 1/2 tab (10mg) daily x 3 days, then stop.  Dispense: 20 tablet; Refill: 0    2. Chronic cough  -See  above.       Meds & Refills for this Visit:  Requested Prescriptions     Signed Prescriptions Disp Refills    montelukast 10 MG Oral Tab 90 tablet 1     Sig: Take 1 tablet (10 mg total) by mouth nightly.    Budesonide-Formoterol Fumarate (SYMBICORT) 80-4.5 MCG/ACT Inhalation Aerosol 1 each 3     Sig: Inhale 2 puffs into the lungs 2 (two) times daily.    predniSONE 20 MG Oral Tab 20 tablet 0     Sig: Take 3 tabs (60mg) daily x 3 days, then 2 tabs (40mg) daily x 3 days, then 1 tab (20mg) daily x 3 days, then 1/2 tab (10mg) daily x 3 days, then stop.         Problem List:  Patient Active Problem List   Diagnosis    Chronic left-sided low back pain with left-sided sciatica       Kimberley Lucas, JAIME  7/8/2024  8:35 AM

## 2024-09-25 ENCOUNTER — NURSE ONLY (OUTPATIENT)
Dept: ALLERGY | Facility: CLINIC | Age: 44
End: 2024-09-25

## 2024-09-25 ENCOUNTER — OFFICE VISIT (OUTPATIENT)
Dept: ALLERGY | Facility: CLINIC | Age: 44
End: 2024-09-25
Payer: COMMERCIAL

## 2024-09-25 DIAGNOSIS — H10.10 SEASONAL AND PERENNIAL ALLERGIC RHINOCONJUNCTIVITIS: Primary | ICD-10-CM

## 2024-09-25 DIAGNOSIS — J30.89 SEASONAL AND PERENNIAL ALLERGIC RHINOCONJUNCTIVITIS: Primary | ICD-10-CM

## 2024-09-25 DIAGNOSIS — Z23 FLU VACCINE NEED: ICD-10-CM

## 2024-09-25 DIAGNOSIS — Z23 NEED FOR PROPHYLACTIC VACCINATION WITH STREPTOCOCCUS PNEUMONIAE (PNEUMOCOCCUS) AND INFLUENZA VACCINES: ICD-10-CM

## 2024-09-25 DIAGNOSIS — J45.40 MODERATE PERSISTENT EXTRINSIC ASTHMA WITHOUT COMPLICATION (HCC): Primary | ICD-10-CM

## 2024-09-25 DIAGNOSIS — H10.10 SEASONAL AND PERENNIAL ALLERGIC RHINOCONJUNCTIVITIS: ICD-10-CM

## 2024-09-25 DIAGNOSIS — J30.2 SEASONAL AND PERENNIAL ALLERGIC RHINOCONJUNCTIVITIS: Primary | ICD-10-CM

## 2024-09-25 DIAGNOSIS — J30.2 SEASONAL AND PERENNIAL ALLERGIC RHINOCONJUNCTIVITIS: ICD-10-CM

## 2024-09-25 DIAGNOSIS — Z23 NEED FOR COVID-19 VACCINE: ICD-10-CM

## 2024-09-25 DIAGNOSIS — J30.89 SEASONAL AND PERENNIAL ALLERGIC RHINOCONJUNCTIVITIS: ICD-10-CM

## 2024-09-25 PROCEDURE — 95004 PERQ TESTS W/ALRGNC XTRCS: CPT | Performed by: ALLERGY & IMMUNOLOGY

## 2024-09-25 PROCEDURE — 99204 OFFICE O/P NEW MOD 45 MIN: CPT | Performed by: ALLERGY & IMMUNOLOGY

## 2024-09-25 PROCEDURE — 95024 IQ TESTS W/ALLERGENIC XTRCS: CPT | Performed by: ALLERGY & IMMUNOLOGY

## 2024-09-25 RX ORDER — BUDESONIDE AND FORMOTEROL FUMARATE DIHYDRATE 160; 4.5 UG/1; UG/1
2 AEROSOL RESPIRATORY (INHALATION) 2 TIMES DAILY
Qty: 1 EACH | Refills: 0 | Status: SHIPPED | OUTPATIENT
Start: 2024-09-25

## 2024-09-25 RX ORDER — LEVOCETIRIZINE DIHYDROCHLORIDE 5 MG/1
5 TABLET, FILM COATED ORAL EVERY EVENING
Qty: 90 TABLET | Refills: 1 | Status: SHIPPED | OUTPATIENT
Start: 2024-09-25

## 2024-09-25 NOTE — PROGRESS NOTES
Erika Javier is a 43 year old female.    HPI:     Chief Complaint   Patient presents with    Allergies     Pt. Presents with nasal congestion x 1 year. Denies any current allergies that she knows off.      Patient is a 43-year-old female who presents for allergy evaluation with a chief complaint of allergies    PCP is through the [facility] [place]    Review of records show prior serum IgE testing in May 9, 2024 to environmental allergens showing IgE production to mold cat cockroach dust mite weeds.  Medications listed include Singulair Claritin albuterol Symbicort 80/4.5    Immunizations reviewed.  COVID-vaccine x 4 doses last in 2023  Last flu vaccine from 2021.  No pneumonia vaccine on record    Today patient reports  Allergies   duration:  1 year   Timing: YR   Symptoms: sob, cough,wz at night , mucus in am, > ie   Freq: of LRT symptoms: 3-4xweek   Severity: Moderate  Status: Worse this year  Triggers: Allergies  Tried: Claritin, symbicort, 80  singulair , albuterol,  pred(mid July a x 2 rounds  flonase   Pets or smokers: 1 dog   Nonsmoker   Triggers: allergies , exercise     Hx of asthma, ad, or food allergy:   + hx of asthma, on symbicort, singulair  Denies current symptoms  Ed or pred over past 1 year   Prior ed or hosp or intubation : denies    Prior pfts: none     No prior PV         HISTORY:  Past Medical History:    History of chicken pox      Past Surgical History:   Procedure Laterality Date    Laparoscopic cholecystectomy        ,      Family History   Problem Relation Age of Onset    Hypertension Mother     Cancer Maternal Grandfather         lung - smoker/asbestos    Ear Problems Other         hearing loss (grandparents)    Breast Cancer Neg     Ovarian Cancer Neg       Social History:   Social History     Socioeconomic History    Marital status:    Tobacco Use    Smoking status: Former    Smokeless tobacco: Never   Vaping Use    Vaping status:  Never Used   Substance and Sexual Activity    Alcohol use: Yes     Alcohol/week: 0.0 standard drinks of alcohol     Comment: Social     Drug use: No    Sexual activity: Yes     Partners: Male     Birth control/protection: Mirena     Comment: same partner   Other Topics Concern    Caffeine Concern No     Comment: coffee/soda, 1cup/day    Stress Concern No    Weight Concern No    Special Diet No    Exercise No    Seat Belt No     Social Determinants of Health     Financial Resource Strain: Low Risk  (2/14/2024)    [facility]    Overall Financial Resource Strain (CARDIA)     Difficulty of Paying Living Expenses: Not hard at all   Food Insecurity: No Food Insecurity (2/14/2024)    [facility]    Hunger Vital Sign     Worried About Running Out of Food in the Last Year: Never true     Ran Out of Food in the Last Year: Never true   Transportation Needs: No Transportation Needs (2/14/2024)    [facility]    PRAPARE - Transportation     Lack of Transportation (Medical): No     Lack of Transportation (Non-Medical): No   Housing Stability: Low Risk  (2/14/2024)    [facility]    Housing Stability Vital Sign     Unable to Pay for Housing in the Last Year: No     Number of Places Lived in the Last Year: 1     Unstable Housing in the Last Year: No        Medications (Active prior to today's visit):  Current Outpatient Medications   Medication Sig Dispense Refill    Budesonide-Formoterol Fumarate (SYMBICORT) 160-4.5 MCG/ACT Inhalation Aerosol Inhale 2 puffs into the lungs 2 (two) times daily. 1 each 0    levocetirizine 5 MG Oral Tab Take 1 tablet (5 mg total) by mouth every evening. 90 tablet 1    montelukast 10 MG Oral Tab Take 1 tablet (10 mg total) by  mouth nightly. 90 tablet 1    albuterol 108 (90 Base) MCG/ACT Inhalation Aero Soln Inhale 2 puffs into the lungs every 6 (six) hours as needed. 1 each 0       Allergies:  No Known Allergies      ROS:     Allergic/Immuno:  See HPI  Cardiovascular:  Negative for irregular heartbeat/palpitations, chest pain, edema  Constitutional:  Negative night sweats,weight loss, irritability and lethargy  Endocrine:  Negative for cold intolerance, polydipsia and polyphagia  ENMT:  Negative for ear drainage, hearing loss a  See HPI  Eyes:  Negative for eye discharge and vision loss  Gastrointestinal:  Negative for abdominal pain, diarrhea and vomiting  Genitourinary:  Negative for dysuria and hematuria  Hema/Lymph:  Negative for easy bleeding and easy bruising  Integumentary:  Negative for pruritus and rash  Musculoskeletal:  Negative for joint symptoms  Neurological:  Negative for dizziness, seizures  Psychiatric:  Negative for inappropriate interaction and psychiatric symptoms  Respiratory:  Negative for cough, dyspnea and wheezing      PHYSICAL EXAM:   Constitutional: responsive, no acute distress noted  Head/Face: NC/Atraumatic  Eyes/Vision: conjunctiva and lids are normal extraocular motion is intact   Ears/Audiometry: tympanic membranes are normal bilaterally hearing is grossly intact  Nose/Mouth/Throat: nose and throat are clear mucous membranes are moist   Neck/Thyroid: neck is supple without adenopathy  Lymphatic: no abnormal cervical, supraclavicular or axillary adenopathy is noted  Respiratory: normal to inspection lungs are clear to auscultation bilaterally normal respiratory effort   Cardiovascular: regular rate and rhythm no murmurs, gallups, or rubs  Abdomen: soft non-tender non-distended  Skin/Hair: no unusual rashes present  Extremities: no edema, cyanosis, or clubbing  Neurological:Oriented to time, place, person & situation       ASSESSMENT/PLAN:   Assessment   Encounter Diagnoses   Name Primary?    Seasonal and  perennial allergic rhinoconjunctivitis     Flu vaccine need     Need for COVID-19 vaccine     Moderate persistent extrinsic asthma without complication (HCC) Yes       Unable to perform spirometry as the machine is not functioning      Skin testing today to common indoor and outdoor environmental allergy as well as positive to grass weeds mold dust mite cat dog   Positive histamine control    #1 asthma.  See above clinical history.  Started within the past year.  Otherwise no prior history of respiratory issues.  Currently on Symbicort 80/4.52 puffs twice a day and Singulair once a day and symptom frequency is still 3-4 times per week.  No prior PFT testing.    Recs:  Handouts on asthma triggers provided and reviewed  Trial of Symbicort 160/4.52 puffs twice a day.  Rinse mouth or brush teeth after using  Continue with Singulair.  If not improving with above recommendations then will consider PFT testing, Trelegy, total IgE level and CBC with absolute eosinophil count  Reviewed goals of treatment her asthma symptoms or albuterol usage 2 days/week or less, prednisone once a year or less.  Patient has used prednisone twice so far this year      #2 allergic rhinitis  See above skin testing to screen for allergic triggers  See above clinical history.  More year-round in nature.  1 dog in the home.  Currently is in Claritin  Reviewed avoidance measures and potential treatment option to therapy  Trial of Xyzal, levocetirizine 5 mg once a day in place of Claritin as an antihistamine  Continue with Singulair 10 mg once a day  Restart Flonase 2 sprays per nostril once a day.      #3 flu vaccine recommended in the fall.  Please check with local pharmacy as we have yet to receive the flu vaccine    #4 COVID-vaccine booster recommended.  New booster available this month.  Please check with local pharmacy as we do not stock the vaccine    #5 pneumonia vaccine with Prevnar 20 recommended given history of asthma for 19 years and  older.         Orders This Visit:  No orders of the defined types were placed in this encounter.      Meds This Visit:  Requested Prescriptions     Signed Prescriptions Disp Refills    Budesonide-Formoterol Fumarate (SYMBICORT) 160-4.5 MCG/ACT Inhalation Aerosol 1 each 0     Sig: Inhale 2 puffs into the lungs 2 (two) times daily.    levocetirizine 5 MG Oral Tab 90 tablet 1     Sig: Take 1 tablet (5 mg total) by mouth every evening.       Imaging & Referrals:  None     9/25/2024  Carlos Lai MD      If medication samples were provided today, they were provided solely for patient education and training related to self administration of these medications.  Teaching, instruction and sample was provided to the patient by myself.  Teaching included  a review of potential adverse side effects as well as potential efficacy.  Patient's questions were answered in regards to medication administration and dosing and potential side effects. Teaching was provided via the teach back method

## 2024-09-25 NOTE — PATIENT INSTRUCTIONS
#1 asthma.  See above clinical history.  Started within the past year.  Otherwise no prior history of respiratory issues.  Currently on Symbicort 80/4.52 puffs twice a day and Singulair once a day and symptom frequency is still 3-4 times per week.  No prior PFT testing.    Recs:  Handouts on asthma triggers provided and reviewed  Trial of Symbicort 160/4.52 puffs twice a day.  Rinse mouth or brush teeth after using  Continue with Singulair.  If not improving with above recommendations then will consider PFT testing, Trelegy, total IgE level and CBC with absolute eosinophil count  Reviewed goals of treatment her asthma symptoms or albuterol usage 2 days/week or less, prednisone once a year or less.  Patient has used prednisone twice so far this year      #2 allergic rhinitis  See above skin testing to screen for allergic triggers  See above clinical history.  More year-round in nature.  1 dog in the home.  Currently is in Claritin  Reviewed avoidance measures and potential treatment option to therapy  Trial of Xyzal, levocetirizine 5 mg once a day in place of Claritin as an antihistamine  Continue with Singulair 10 mg once a day  Restart Flonase 2 sprays per nostril once a day.      #3 flu vaccine recommended in the fall.  Please check with local pharmacy as we have yet to receive the flu vaccine    #4 COVID-vaccine booster recommended.  New booster available this month.  Please check with local pharmacy as we do not stock the vaccine    #5 pneumonia vaccine with Prevnar 20 recommended given history of asthma for 19 years and older.         Orders This Visit:  No orders of the defined types were placed in this encounter.      Meds This Visit:  Requested Prescriptions     Signed Prescriptions Disp Refills    Budesonide-Formoterol Fumarate (SYMBICORT) 160-4.5 MCG/ACT Inhalation Aerosol 1 each 0     Sig: Inhale 2 puffs into the lungs 2 (two) times daily.    levocetirizine 5 MG Oral Tab 90 tablet 1     Sig: Take 1 tablet  (5 mg total) by mouth every evening.

## 2025-01-27 RX ORDER — BUDESONIDE AND FORMOTEROL FUMARATE DIHYDRATE 160; 4.5 UG/1; UG/1
2 AEROSOL RESPIRATORY (INHALATION) 2 TIMES DAILY
Qty: 1 EACH | Refills: 2 | Status: SHIPPED | OUTPATIENT
Start: 2025-01-27

## 2025-01-27 NOTE — TELEPHONE ENCOUNTER
Patient last seen in Allergy 9/25/2024 for . . .    Seasonal and perennial allergic rhinoconjunctivitis       Flu vaccine need      Need for COVID-19 vaccine      Moderate persistent extrinsic asthma without complication (HCC)      Requested Prescriptions   Pending Prescriptions Disp Refills    Budesonide-Formoterol Fumarate (SYMBICORT) 160-4.5 MCG/ACT Inhalation Aerosol 1 each 0     Sig: Inhale 2 puffs into the lungs 2 (two) times daily.       Corticosteroids / Long-Acting Bronchodilators Passed - 1/27/2025  5:53 PM        Passed - Appt in past 6 mos or next 3 mos     Recent Outpatient Visits              4 months ago Seasonal and perennial allergic rhinoconjunctivitis    [facility]    Nurse Only    4 months ago Moderate persistent extrinsic asthma without complication (HCC)    [facility] Carlos Lai MD    Office Visit    6 months ago Moderate persistent asthma with acute exacerbation (HCC)    [facility] ([place], [place]), Barbi Arita Leslie, APRN    Office Visit    8 months ago Chronic cough    [facility] ([place], [place]), Barbi Arita Leslie, APRN    Office Visit    8 months ago Chronic cough    [facility] ([place], [place]), Barbi Arita Leslie, APRN    Office Visit          Future Appointments         Provider Department Appt Notes    In 2 weeks Partha Aldridge DO [facility] - OB/GYN annual gyne exam pap                    Passed - Medication is active on med list           Budesonide-Formoterol Fumarate (SYMBICORT) 160-4.5 MCG/ACT Inhalation Aerosol 1 each 2 1/27/2025 --    Sig - Route: Inhale 2 puffs into the lungs 2 (two) times daily. Please schedule a follow-up appointment with Dr. Lai in order to receive further refills. - Inhalation    Sent to pharmacy as: Budesonide-Formoterol  Fumarate 160-4.5 MCG/ACT Inhalation Aerosol (Symbicort)    E-Prescribing Status: Receipt confirmed by pharmacy (1/27/2025  5:55 PM CST)      Pharmacy    [id number] - [place], [place] - [address], [phone], [phone]     Prescription as above sent to pharmacy per protocol.

## 2025-01-28 ENCOUNTER — OFFICE VISIT (OUTPATIENT)
Dept: FAMILY MEDICINE CLINIC | Facility: CLINIC | Age: 45
End: 2025-01-28
Payer: COMMERCIAL

## 2025-01-28 ENCOUNTER — NURSE TRIAGE (OUTPATIENT)
Dept: FAMILY MEDICINE CLINIC | Facility: CLINIC | Age: 45
End: 2025-01-28

## 2025-01-28 VITALS
WEIGHT: 204.38 LBS | DIASTOLIC BLOOD PRESSURE: 70 MMHG | HEART RATE: 71 BPM | OXYGEN SATURATION: 97 % | SYSTOLIC BLOOD PRESSURE: 124 MMHG | HEIGHT: 69 IN | BODY MASS INDEX: 30.27 KG/M2 | TEMPERATURE: 98 F

## 2025-01-28 DIAGNOSIS — J45.41 MODERATE PERSISTENT EXTRINSIC ASTHMA WITH ACUTE EXACERBATION (HCC): Primary | ICD-10-CM

## 2025-01-28 DIAGNOSIS — R06.2 WHEEZING: ICD-10-CM

## 2025-01-28 DIAGNOSIS — R05.3 CHRONIC COUGH: ICD-10-CM

## 2025-01-28 DIAGNOSIS — Z71.85 IMMUNIZATION COUNSELING: ICD-10-CM

## 2025-01-28 PROCEDURE — 99214 OFFICE O/P EST MOD 30 MIN: CPT | Performed by: STUDENT IN AN ORGANIZED HEALTH CARE EDUCATION/TRAINING PROGRAM

## 2025-01-28 PROCEDURE — 3078F DIAST BP <80 MM HG: CPT | Performed by: STUDENT IN AN ORGANIZED HEALTH CARE EDUCATION/TRAINING PROGRAM

## 2025-01-28 PROCEDURE — 3008F BODY MASS INDEX DOCD: CPT | Performed by: STUDENT IN AN ORGANIZED HEALTH CARE EDUCATION/TRAINING PROGRAM

## 2025-01-28 PROCEDURE — 3074F SYST BP LT 130 MM HG: CPT | Performed by: STUDENT IN AN ORGANIZED HEALTH CARE EDUCATION/TRAINING PROGRAM

## 2025-01-28 RX ORDER — MONTELUKAST SODIUM 10 MG/1
10 TABLET ORAL DAILY
Qty: 90 TABLET | Refills: 0 | Status: SHIPPED | OUTPATIENT
Start: 2025-01-28

## 2025-01-28 RX ORDER — FLUTICASONE PROPIONATE AND SALMETEROL XINAFOATE 115; 21 UG/1; UG/1
2 AEROSOL, METERED RESPIRATORY (INHALATION) 2 TIMES DAILY
Qty: 1 EACH | Refills: 2 | Status: SHIPPED | OUTPATIENT
Start: 2025-01-28

## 2025-01-28 RX ORDER — ALBUTEROL SULFATE 90 UG/1
2 INHALANT RESPIRATORY (INHALATION) EVERY 6 HOURS PRN
Qty: 1 EACH | Refills: 2 | Status: SHIPPED | OUTPATIENT
Start: 2025-01-28

## 2025-01-28 NOTE — TELEPHONE ENCOUNTER
RN spoke with patient.     Patient saw allergist, states that she is allergic to her dog at home. Patient has been on Symbicort for treatment. Patient insurance is no longer covering Symbicort.     Patient denies chest pain or shortness of breath. Patient says she is having intermittent wheezing that is audible and she can \"feel it\".     Patient scheduled for visit today with Dr. Abreu for assessment and evaluation of symptoms.     Patient in agreement with the plan.            Reason for Disposition   Mild wheezing comes and goes and lasts > 3 days    Protocols used: Asthma Attack-A-[place]

## 2025-01-28 NOTE — TELEPHONE ENCOUNTER
A refill request was received for:  Requested Prescriptions     Pending Prescriptions Disp Refills    albuterol 108 (90 Base) MCG/ACT Inhalation Aero Soln 1 each 0     Sig: Inhale 2 puffs into the lungs every 6 (six) hours as needed.     Last refill date:  71/24   Qty: 1 each  Dx: asthma  Last office visit:  7/8/24   When is follow up due: now      Future Appointments   Date Time Provider Department Center   2/10/2025  8:00 AM Partha Aldridge DO [facility]

## 2025-01-28 NOTE — PROGRESS NOTES
Subjective:   Erika Javier is a 44 year old female who presents for Asthma     44-year-old female coming in due to asthma exacerbation.  Patient follows up with an allergist, was started on Symbicort and singular.  Has not been on Singulair since her Rx finished and not renewed.  This year Symbicort is no longer covered by her insurance which prompted patient to use albuterol more frequently and now needs a refill for it.  Feels congested however denies fever, chills, SOB, difficulty breathing, chest pain.    History/Other:    Chief Complaint Reviewed and Verified  No Further Nursing Notes to   Review  Tobacco Reviewed  Allergies Reviewed  Medications Reviewed    Problem List Reviewed  Medical History Reviewed  Surgical History   Reviewed  OB Status Reviewed  Family History Reviewed  Social History   Reviewed         Tobacco:  She smoked tobacco in the past but quit greater than 12 months ago.  Social History     Tobacco Use   Smoking Status Former   Smokeless Tobacco Never        Current Outpatient Medications   Medication Sig Dispense Refill    montelukast (SINGULAIR) 10 MG Oral Tab Take 1 tablet (10 mg total) by mouth daily. 90 tablet 0    albuterol 108 (90 Base) MCG/ACT Inhalation Aero Soln Inhale 2 puffs into the lungs every 6 (six) hours as needed. 1 each 2    fluticasone-salmeterol (ADVAIR HFA) 115-21 MCG/ACT Inhalation Aerosol Inhale 2 puffs into the lungs 2 (two) times daily. 1 each 2    albuterol 108 (90 Base) MCG/ACT Inhalation Aero Soln Inhale 2 puffs into the lungs every 6 (six) hours as needed. 1 each 0         Review of Systems:  Review of Systems   Constitutional:  Negative for chills, diaphoresis and fever.   HENT:  Positive for congestion. Negative for ear discharge, ear pain, sinus pressure, sinus pain and sore throat.    Eyes:  Negative for pain and discharge.   Respiratory:  Negative for cough, chest tightness, shortness of breath and wheezing.    Cardiovascular:  Negative for  chest pain and palpitations.   Gastrointestinal:  Negative for abdominal pain, diarrhea, nausea and vomiting.   Endocrine: Negative for cold intolerance and heat intolerance.   Genitourinary:  Negative for dysuria, flank pain, frequency and urgency.   Musculoskeletal:  Negative for joint swelling.   Skin:  Negative for rash.   Neurological:  Negative for dizziness, syncope and headaches.   Psychiatric/Behavioral:  Negative for confusion and hallucinations.        Objective:   /70   Pulse 71   Temp 98.3 °F (36.8 °C)   Ht 5' 9\" (1.753 m)   Wt 204 lb 6.4 oz (92.7 kg)   SpO2 97%   BMI 30.18 kg/m²  Estimated body mass index is 30.18 kg/m² as calculated from the following:    Height as of this encounter: 5' 9\" (1.753 m).    Weight as of this encounter: 204 lb 6.4 oz (92.7 kg).  Physical Exam  Constitutional:       General: She is not in acute distress.     Appearance: Normal appearance. She is not ill-appearing or toxic-appearing.   HENT:      Head: Normocephalic and atraumatic.      Mouth/Throat:      Mouth: Mucous membranes are moist.      Pharynx: Oropharynx is clear. No oropharyngeal exudate or posterior oropharyngeal erythema.   Cardiovascular:      Rate and Rhythm: Normal rate and regular rhythm.      Heart sounds: Normal heart sounds. No murmur heard.     No gallop.   Pulmonary:      Effort: Pulmonary effort is normal. No respiratory distress.      Breath sounds: Normal breath sounds. No stridor. No wheezing, rhonchi or rales.   Abdominal:      General: Bowel sounds are normal.   Musculoskeletal:      Cervical back: Normal range of motion and neck supple.   Skin:     General: Skin is warm and dry.   Neurological:      General: No focal deficit present.      Mental Status: She is alert and oriented to person, place, and time. Mental status is at baseline.   Psychiatric:         Mood and Affect: Mood normal.         Behavior: Behavior normal.         Thought Content: Thought content normal.          Judgment: Judgment normal.         Assessment & Plan:   1. Moderate persistent extrinsic asthma with acute exacerbation (HCC) (Primary)  No wheezing at this time, clinically stable, saturating 97% on room air.  Using albuterol more frequently given that she does not have Symbicort anymore.  -Restart Singulair  -Advair sent to pharmacy.  Symbicort no longer covered by insurance.  -Albuterol as needed  -Continue follow-up with allergist.  -Follow-up/ED precautions.  -     Montelukast Sodium; Take 1 tablet (10 mg total) by mouth daily.  Dispense: 90 tablet; Refill: 0  -     Albuterol Sulfate HFA; Inhale 2 puffs into the lungs every 6 (six) hours as needed.  Dispense: 1 each; Refill: 2  -     Fluticasone-Salmeterol; Inhale 2 puffs into the lungs 2 (two) times daily.  Dispense: 1 each; Refill: 2  2. Immunization counseling  Discussed immunizations with patient.          Return in about 4 weeks (around 2/25/2025) for asthma symptoms.  If symptoms worsen or do not improve.    Evens Abreu MD, 1/28/2025, 5:00 PM

## 2025-01-29 RX ORDER — ALBUTEROL SULFATE 90 UG/1
2 INHALANT RESPIRATORY (INHALATION) EVERY 6 HOURS PRN
Qty: 1 EACH | Refills: 0 | OUTPATIENT
Start: 2025-01-29

## 2025-02-10 ENCOUNTER — OFFICE VISIT (OUTPATIENT)
Dept: OBGYN CLINIC | Facility: CLINIC | Age: 45
End: 2025-02-10
Payer: COMMERCIAL

## 2025-02-10 VITALS
BODY MASS INDEX: 30.04 KG/M2 | HEART RATE: 76 BPM | WEIGHT: 202.81 LBS | SYSTOLIC BLOOD PRESSURE: 118 MMHG | HEIGHT: 69 IN | DIASTOLIC BLOOD PRESSURE: 83 MMHG

## 2025-02-10 DIAGNOSIS — Z12.31 SCREENING MAMMOGRAM, ENCOUNTER FOR: ICD-10-CM

## 2025-02-10 DIAGNOSIS — Z12.4 SCREENING FOR CERVICAL CANCER: ICD-10-CM

## 2025-02-10 DIAGNOSIS — Z12.4 CERVICAL CANCER SCREENING: ICD-10-CM

## 2025-02-10 DIAGNOSIS — Z01.419 WELL WOMAN EXAM: Primary | ICD-10-CM

## 2025-02-10 PROCEDURE — 3079F DIAST BP 80-89 MM HG: CPT | Performed by: OBSTETRICS & GYNECOLOGY

## 2025-02-10 PROCEDURE — 99396 PREV VISIT EST AGE 40-64: CPT | Performed by: OBSTETRICS & GYNECOLOGY

## 2025-02-10 PROCEDURE — 3008F BODY MASS INDEX DOCD: CPT | Performed by: OBSTETRICS & GYNECOLOGY

## 2025-02-10 PROCEDURE — 3074F SYST BP LT 130 MM HG: CPT | Performed by: OBSTETRICS & GYNECOLOGY

## 2025-02-10 NOTE — PROGRESS NOTES
Chief Complaint   Patient presents with    Gyn Exam     ANNUAL EXAM, mammogram ORDER       HPI:  The patient is a 43 yo F here for WWE.  No menses. /monogamous.  Due for Mirena exchange in Dec 2025.  Wants another Mirena.      No LMP recorded. (Menstrual status: IUD - Intrauterine Device).        Latest Ref Rng & Units 2022     1:38 PM   RECENT PAP RESULTS   INTERPRETATION/RESULT: Negative for intraepithelial lesion or malignancy Negative for intraepithelial lesion or malignancy    HPV Negative Negative         Menarche: 14 or 15 years old   Period Cycle (Days): Absent due to Mirena   Use of Birth Control (if yes, specify type): Mirena IUD   Hx Prior Abnormal Pap: No  Pap Date: 22  Pap Result Notes: Neg Pap/HPV // Mammo 3/21/24 BENIGN      Chaperone: declines      Depression Screening (PHQ-2/PHQ-9): Over the LAST 2 WEEKS   Little interest or pleasure in doing things (over the last two weeks)?: Not at all    Feeling down, depressed, or hopeless (over the last two weeks)?: Not at all    PHQ-2 SCORE: 0          Reviewed medical and surgical history below       OBSTETRICS HISTORY:  OB History    Para Term  AB Living   2 2 2 0 0 2   SAB IAB Ectopic Multiple Live Births   0 0 0 0 2       GYNE HISTORY:  History   Sexual Activity    Sexual activity: Yes    Partners: Male    Birth control/ protection: Mirena     Comment: same partner     Menarche: 14 or 15 years old   Period Cycle (Days): Absent due to Mirena   Use of Birth Control (if yes, specify type): Mirena IUD       MEDICAL HISTORY:  Past Medical History:    History of chicken pox       SURGICAL HISTORY:  Past Surgical History:   Procedure Laterality Date    Laparoscopic cholecystectomy        ,       SOCIAL HISTORY:  Social History     Socioeconomic History    Marital status:      Spouse name: Not on file    Number of children: Not on file    Years of education: Not on file    Highest education level: Not on file    Occupational History    Not on file   Tobacco Use    Smoking status: Former    Smokeless tobacco: Never   Vaping Use    Vaping status: Never Used   Substance and Sexual Activity    Alcohol use: Yes     Alcohol/week: 0.0 standard drinks of alcohol     Comment: Social     Drug use: No    Sexual activity: Yes     Partners: Male     Birth control/protection: Mirena     Comment: same partner   Other Topics Concern     Service Not Asked    Blood Transfusions Not Asked    Caffeine Concern No     Comment: coffee/soda, 1cup/day    Occupational Exposure Not Asked    Hobby Hazards Not Asked    Sleep Concern Not Asked    Stress Concern No    Weight Concern No    Special Diet No    Back Care Not Asked    Exercise No    Bike Helmet Not Asked    Seat Belt No    Self-Exams Not Asked   Social History Narrative    Not on file     Social Drivers of Health     Food Insecurity: No Food Insecurity (1/28/2025)    NCSS - Food Insecurity     Worried About Running Out of Food in the Last Year: No     Ran Out of Food in the Last Year: No   Transportation Needs: No Transportation Needs (1/28/2025)    NCSS - Transportation     Lack of Transportation: No   Stress: Not on file   Housing Stability: Not At Risk (1/28/2025)    NCSS - Housing/Utilities     Has Housing: Yes     Worried About Losing Housing: No     Unable to Get Utilities: No       FAMILY HISTORY:  Family History   Problem Relation Age of Onset    Hypertension Mother     Cancer Maternal Grandfather         lung - smoker/asbestos    Ear Problems Other         hearing loss (grandparents)    Breast Cancer Neg     Ovarian Cancer Neg        MEDICATIONS:    Current Outpatient Medications:     montelukast (SINGULAIR) 10 MG Oral Tab, Take 1 tablet (10 mg total) by mouth daily., Disp: 90 tablet, Rfl: 0    albuterol 108 (90 Base) MCG/ACT Inhalation Aero Soln, Inhale 2 puffs into the lungs every 6 (six) hours as needed., Disp: 1 each, Rfl: 2    fluticasone-salmeterol (ADVAIR HFA) 115-21  MCG/ACT Inhalation Aerosol, Inhale 2 puffs into the lungs 2 (two) times daily., Disp: 1 each, Rfl: 2    albuterol 108 (90 Base) MCG/ACT Inhalation Aero Soln, Inhale 2 puffs into the lungs every 6 (six) hours as needed., Disp: 1 each, Rfl: 0    ALLERGIES:  Allergies[1]      Review of Systems:  Constitutional:  Denies fevers and chills   Cardiovascular:  denies chest pain or palpitations  Respiratory:  denies shortness of breath  Gastrointestinal:  denies heartburn, abdominal pain, diarrhea or constipation  Genitourinary:  denies dysuria, incontinence, abnormal vaginal discharge, vaginal itching  Musculoskeletal:  denies back pain.  Skin/Breast:  Denies any breast pain, lumps, or discharge.   Neurological:  denies headaches, extremity weakness  Psychiatric: denies depression or anxiety.      Vitals:    02/10/25 0758   BP: 118/83   Pulse: 76       PHYSICAL EXAM:   Constitutional: well developed, well nourished  Head/Face: normocephalic  Neck/Thyroid: thyroid symmetric, no thyromegaly, no nodules, no adenopathy  Heart: Regular rate and rhythm   Lungs: clear to ascultation bilaterally   Lymphatic:no abnormal supraclavicular or axillary adenopathy is noted  Breast: normal without palpable masses, tenderness, asymmetry, nipple discharge, nipple retraction or skin changes  Abdomen:  soft, nontender, nondistended, no masses  Skin/Hair: no unusual rashes or bruises  Extremities: no edema, no cyanosis  Psychiatric: Appropriate mood and affect    Pelvic Exam:  External Genitalia: normal appearance, hair distribution, and no lesions  Urethral Meatus:  normal in size, location, without lesions and prolapse  Bladder:  No fullness, masses or tenderness  Vagina:  Normal appearance without lesions, no abnormal discharge  Cervix:  Normal without tenderness on motion; +strings  Uterus: normal in size, contour, position, mobility, without tenderness  Adnexa: normal without masses or tenderness  Perineum:  normal    Assessment/Plan:  Erika was seen today for gyn exam.    Diagnoses and all orders for this visit:    Well woman exam    Cervical cancer screening    Screening mammogram, encounter for  -     [facility] TRACI 2D+3D SCREENING BILAT (CPT=77067/61331); Future    Screening for cervical cancer  -     ThinPrep PAP Smear; Future  -     Hpv Dna  High Risk , Thin Prep Collect; Future  -     Hpv Dna  High Risk , Thin Prep Collect  -     ThinPrep PAP Smear        WWE:   Reviewed ASCCP guidelines with the patient -- Pap today  Contraception: mirena--due for exchange by Dec 2025.  I asked Erika to send me a  msg in the fall to schedule exchange.  Will need urine urine pregnancy test before appointment, cytotec the night before and motrin 600mg 1h before.    Breast Health:     Mammo rx'd for March 2025  Encouraged monthly self breast exams with the patient   Discussed diet, exercise, MVIs with Vit D  Follow up in 1 yr for WWE           [1] No Known Allergies

## 2025-02-11 LAB — HPV E6+E7 MRNA CVX QL NAA+PROBE: NEGATIVE

## 2025-03-20 ENCOUNTER — OFFICE VISIT (OUTPATIENT)
Dept: FAMILY MEDICINE CLINIC | Facility: CLINIC | Age: 45
End: 2025-03-20
Payer: COMMERCIAL

## 2025-03-20 VITALS
WEIGHT: 198.81 LBS | TEMPERATURE: 98 F | HEIGHT: 69 IN | SYSTOLIC BLOOD PRESSURE: 120 MMHG | BODY MASS INDEX: 29.45 KG/M2 | HEART RATE: 83 BPM | OXYGEN SATURATION: 96 % | DIASTOLIC BLOOD PRESSURE: 80 MMHG

## 2025-03-20 DIAGNOSIS — J45.41 MODERATE PERSISTENT EXTRINSIC ASTHMA WITH ACUTE EXACERBATION (HCC): Primary | ICD-10-CM

## 2025-03-20 DIAGNOSIS — R05.2 SUBACUTE COUGH: ICD-10-CM

## 2025-03-20 PROCEDURE — 3074F SYST BP LT 130 MM HG: CPT | Performed by: NURSE PRACTITIONER

## 2025-03-20 PROCEDURE — 3008F BODY MASS INDEX DOCD: CPT | Performed by: NURSE PRACTITIONER

## 2025-03-20 PROCEDURE — 3079F DIAST BP 80-89 MM HG: CPT | Performed by: NURSE PRACTITIONER

## 2025-03-20 PROCEDURE — 99213 OFFICE O/P EST LOW 20 MIN: CPT | Performed by: NURSE PRACTITIONER

## 2025-03-20 RX ORDER — PREDNISONE 20 MG/1
TABLET ORAL
Qty: 26 TABLET | Refills: 0 | Status: SHIPPED | OUTPATIENT
Start: 2025-03-20

## 2025-03-20 RX ORDER — BUDESONIDE AND FORMOTEROL FUMARATE DIHYDRATE 80; 4.5 UG/1; UG/1
2 AEROSOL RESPIRATORY (INHALATION) 2 TIMES DAILY
Qty: 3 EACH | Refills: 2 | Status: SHIPPED | OUTPATIENT
Start: 2025-03-20

## 2025-03-20 NOTE — PROGRESS NOTES
Chief Complaint:   Chief Complaint   Patient presents with    Cough       HPI:   This is a 44 year old female coming in for asthma symptoms x 3 weeks. Reports having persistent cough with occasional phlegm, SOB with stairs or fast paced walk, chest pressure and tightness, and waking up from sleep due to cough. Notes when cough started had about 1 week of nasal congestion, which has since resolved. Has been using albuterol inhaler up to 12x/day. Reports improvement with inhaler but then symptoms will return shortly after. Denies wheezing, chest pain, or fevers. Was seen in January for asthma and prescribed Advair due to insurance not covering Symbicort. Patient states she tried to  Advair but insurance would not cover that either. Patient has been using Singulair daily as well as Flonase nasal spray and daily antihistamine.      Results for orders placed or performed in visit on 02/10/25   Hpv Dna  High Risk , Thin Prep Collect    Collection Time: 02/10/25  8:28 AM   Result Value Ref Range    HPV High Risk Negative Negative    HPV Source Cervical/endocervical    THINPREP PAP SMEAR ONLY    Collection Time: 02/10/25  8:28 AM   Result Value Ref Range    Interpretation/Result Negative for intraepithelial lesion or malignancy Negative for intraepithelial lesion or malignancy    Specimen Adequacy       Satisfactory for evaluation. Endocervical or metaplastic cells present    General Categorization Negative for intraepithelial lesion or malignancy       HPV High Risk mRNA       Negative  Normal        Recommendations/Comments       Screened by the Thinprep Imaging System and a cytotechnologist.      Embedded Images      Procedure       Monolayers:  1, specimen collected in Thinprep vial, 20 ml Preservcyt      Clinical Information       Z12.4 Screening For Cervical Cancer.         Reason for testing Screening     Gyn Additional Information       NOTE:  The Pap smear is a screening test that aids in the detection of  cervical cancer and its precursors.  False negative and false positive results can occur. Regular sampling is recommended to minimize false negative results.      Case Report       Gynecologic Cytology                              Case: [id number]                                  Authorizing Provider:  Partha Aldridge DO     Collected:           02/10/2025 08:28 AM          Ordering Location:     [facility]    Received:            2025 11:24 AM                                 [facility]                                                            First Screen:          Farrukh Torres                                                                Specimen:    ThinPrep Imager Screening Pap, Cervical/endocervical                                                Past Medical History:    History of chicken pox     Past Surgical History:   Procedure Laterality Date    Laparoscopic cholecystectomy        ,     Social History:  Social History     Socioeconomic History    Marital status:    Tobacco Use    Smoking status: Former    Smokeless tobacco: Never   Vaping Use    Vaping status: Never Used   Substance and Sexual Activity    Alcohol use: Yes     Alcohol/week: 0.0 standard drinks of alcohol     Comment: Social     Drug use: No    Sexual activity: Yes     Partners: Male     Birth control/protection: Mirena     Comment: same partner   Other Topics Concern    Caffeine Concern No     Comment: coffee/soda, 1cup/day    Stress Concern No    Weight Concern No    Special Diet No    Exercise No    Seat Belt No     Social Drivers of Health     Food Insecurity: No Food Insecurity (2025)    NCSS - Food Insecurity     Worried About Running Out of Food in the Last Year: No     Ran Out of Food in the Last Year: No   Transportation Needs: No Transportation Needs (2025)    NCSS -  Transportation     Lack of Transportation: No   Housing Stability: Not At Risk (1/28/2025)    NCSS - Housing/Utilities     Has Housing: Yes     Worried About Losing Housing: No     Unable to Get Utilities: No     Family History:  Family History   Problem Relation Age of Onset    Hypertension Mother     Cancer Maternal Grandfather         lung - smoker/asbestos    Ear Problems Other         hearing loss (grandparents)    Breast Cancer Neg     Ovarian Cancer Neg      Allergies:  Allergies[1]  Current Meds:  Current Outpatient Medications   Medication Sig Dispense Refill    Budesonide-Formoterol Fumarate (SYMBICORT) 80-4.5 MCG/ACT Inhalation Aerosol Inhale 2 puffs into the lungs 2 (two) times daily. 3 each 2    predniSONE 20 MG Oral Tab Prednisone 60mg (3 tabs) by mouth daily x 4 days, then 40mg (2 tabs) daily x 4 days, then 20mg (1 tab) daily x 4 days, then 10mg (1/2 tab) daily x 4 days, then stop. Take with food. 26 tablet 0    montelukast (SINGULAIR) 10 MG Oral Tab Take 1 tablet (10 mg total) by mouth daily. 90 tablet 0    albuterol 108 (90 Base) MCG/ACT Inhalation Aero Soln Inhale 2 puffs into the lungs every 6 (six) hours as needed. 1 each 2    albuterol 108 (90 Base) MCG/ACT Inhalation Aero Soln Inhale 2 puffs into the lungs every 6 (six) hours as needed. 1 each 0      Counseling given: Not Answered       REVIEW OF SYSTEMS:   CONSTITUTIONAL:  Denies unusual weight gain/loss, fever, chills, or fatigue.  HEENT:  Eyes:  Denies eye pain, visual loss, blurred vision. Denies hearing loss, sneezing, runny nose or sore throat. +resolved nasal congestion  CARDIOVASCULAR:  Denies chest pain, palpitations, edema.  RESPIRATORY:  see HPI    EXAM:   /80   Pulse 83   Temp 97.6 °F (36.4 °C)   Ht 5' 9\" (1.753 m)   Wt 198 lb 12.8 oz (90.2 kg)   SpO2 96%   BMI 29.36 kg/m²  Estimated body mass index is 29.36 kg/m² as calculated from the following:    Height as of this encounter: 5' 9\" (1.753 m).    Weight as of this  encounter: 198 lb 12.8 oz (90.2 kg).   Vital signs reviewed.Appears stated age, well groomed, in no acute distress.  Physical Exam:  GEN:  Patient is alert, awake and oriented, well developed, well nourished.  HEENT:  Head:  Normocephalic, atraumatic Eyes: EOMI, PERRLA, conjunctivae clear bilaterally, no eye discharge Ears: External normal, TM clear bilaterally. Nose: patent, no nasal discharge. Throat:  No tonsillar erythema or exudate.  Mouth:  No oral lesions, good dentition.  NECK: Supple, no CLAD, no thyromegaly.  HEART:  Regular rate and rhythm, no murmurs, rubs or gallops.  LUNGS: Clear to auscultation, mildly diminished bilaterally, no rales/rhonchi/wheezing. Mild cough noted during exam.    ASSESSMENT AND PLAN:   1. Moderate persistent extrinsic asthma with acute exacerbation (HCC)  - steroid taper sent to help with exacerbation  - discussed side effects of steroids  - Symbicort maintenance inhaler sent, daily inhaler needed for moderate asthma  - if Symbicort is still not covered, patient to ask pharmacy or insurance for list of covered maintenance inhalers and notify office so rx can be changed  - to ER with fevers, CP, worsening SOB  - return in 1 week for recheck  - Budesonide-Formoterol Fumarate (SYMBICORT) 80-4.5 MCG/ACT Inhalation Aerosol; Inhale 2 puffs into the lungs 2 (two) times daily.  Dispense: 3 each; Refill: 2  - predniSONE 20 MG Oral Tab; Prednisone 60mg (3 tabs) by mouth daily x 4 days, then 40mg (2 tabs) daily x 4 days, then 20mg (1 tab) daily x 4 days, then 10mg (1/2 tab) daily x 4 days, then stop. Take with food.  Dispense: 26 tablet; Refill: 0    2. Subacute cough  - related to acute asthma exacerbation likely as a result of no maintenance inhaler  - see above  - predniSONE 20 MG Oral Tab; Prednisone 60mg (3 tabs) by mouth daily x 4 days, then 40mg (2 tabs) daily x 4 days, then 20mg (1 tab) daily x 4 days, then 10mg (1/2 tab) daily x 4 days, then stop. Take with food.  Dispense: 26  tablet; Refill: 0      Meds & Refills for this Visit:  Requested Prescriptions     Signed Prescriptions Disp Refills    Budesonide-Formoterol Fumarate (SYMBICORT) 80-4.5 MCG/ACT Inhalation Aerosol 3 each 2     Sig: Inhale 2 puffs into the lungs 2 (two) times daily.    predniSONE 20 MG Oral Tab 26 tablet 0     Sig: Prednisone 60mg (3 tabs) by mouth daily x 4 days, then 40mg (2 tabs) daily x 4 days, then 20mg (1 tab) daily x 4 days, then 10mg (1/2 tab) daily x 4 days, then stop. Take with food.         Problem List:  Patient Active Problem List   Diagnosis    Chronic left-sided low back pain with left-sided sciatica       Kimberley Lucas, JAIME  3/20/2025  11:41 AM         [1] No Known Allergies

## 2025-04-08 ENCOUNTER — HOSPITAL ENCOUNTER (OUTPATIENT)
Dept: MAMMOGRAPHY | Age: 45
Discharge: HOME | End: 2025-04-08
Attending: OBSTETRICS & GYNECOLOGY
Payer: COMMERCIAL

## 2025-04-08 DIAGNOSIS — Z12.31 SCREENING MAMMOGRAM, ENCOUNTER FOR: ICD-10-CM

## 2025-04-08 PROCEDURE — 77067 SCR MAMMO BI INCL CAD: CPT | Performed by: OBSTETRICS & GYNECOLOGY

## 2025-04-08 PROCEDURE — 77063 BREAST TOMOSYNTHESIS BI: CPT | Performed by: OBSTETRICS & GYNECOLOGY

## 2025-04-15 ENCOUNTER — HOSPITAL ENCOUNTER (OUTPATIENT)
Dept: ULTRASOUND IMAGING | Facility: HOSPITAL | Age: 45
Discharge: HOME | End: 2025-04-15
Attending: OBSTETRICS & GYNECOLOGY
Payer: COMMERCIAL

## 2025-04-15 ENCOUNTER — HOSPITAL ENCOUNTER (OUTPATIENT)
Dept: MAMMOGRAPHY | Facility: HOSPITAL | Age: 45
Discharge: HOME | End: 2025-04-15
Attending: OBSTETRICS & GYNECOLOGY
Payer: COMMERCIAL

## 2025-04-15 DIAGNOSIS — R92.8 ABNORMAL MAMMOGRAM: ICD-10-CM

## 2025-04-15 PROCEDURE — 76642 ULTRASOUND BREAST LIMITED: CPT | Performed by: OBSTETRICS & GYNECOLOGY

## 2025-04-15 PROCEDURE — 77061 BREAST TOMOSYNTHESIS UNI: CPT | Performed by: OBSTETRICS & GYNECOLOGY

## 2025-04-15 PROCEDURE — 77065 DX MAMMO INCL CAD UNI: CPT | Performed by: OBSTETRICS & GYNECOLOGY

## 2025-05-09 ENCOUNTER — OFFICE VISIT (OUTPATIENT)
Dept: FAMILY MEDICINE CLINIC | Facility: CLINIC | Age: 45
End: 2025-05-09
Payer: COMMERCIAL

## 2025-05-09 VITALS
TEMPERATURE: 99 F | HEIGHT: 69 IN | HEART RATE: 83 BPM | BODY MASS INDEX: 29.62 KG/M2 | SYSTOLIC BLOOD PRESSURE: 108 MMHG | OXYGEN SATURATION: 97 % | DIASTOLIC BLOOD PRESSURE: 62 MMHG | WEIGHT: 200 LBS

## 2025-05-09 DIAGNOSIS — J02.9 SORE THROAT: Primary | ICD-10-CM

## 2025-05-09 LAB
CONTROL LINE PRESENT WITH A CLEAR BACKGROUND (YES/NO): YES YES/NO
KIT LOT #: NORMAL NUMERIC

## 2025-05-09 PROCEDURE — 3074F SYST BP LT 130 MM HG: CPT | Performed by: STUDENT IN AN ORGANIZED HEALTH CARE EDUCATION/TRAINING PROGRAM

## 2025-05-09 PROCEDURE — 87880 STREP A ASSAY W/OPTIC: CPT | Performed by: STUDENT IN AN ORGANIZED HEALTH CARE EDUCATION/TRAINING PROGRAM

## 2025-05-09 PROCEDURE — 3008F BODY MASS INDEX DOCD: CPT | Performed by: STUDENT IN AN ORGANIZED HEALTH CARE EDUCATION/TRAINING PROGRAM

## 2025-05-09 PROCEDURE — 87081 CULTURE SCREEN ONLY: CPT | Performed by: STUDENT IN AN ORGANIZED HEALTH CARE EDUCATION/TRAINING PROGRAM

## 2025-05-09 PROCEDURE — 99214 OFFICE O/P EST MOD 30 MIN: CPT | Performed by: STUDENT IN AN ORGANIZED HEALTH CARE EDUCATION/TRAINING PROGRAM

## 2025-05-09 PROCEDURE — 3078F DIAST BP <80 MM HG: CPT | Performed by: STUDENT IN AN ORGANIZED HEALTH CARE EDUCATION/TRAINING PROGRAM

## 2025-05-09 RX ORDER — AMOXICILLIN 500 MG/1
500 CAPSULE ORAL 2 TIMES DAILY
Qty: 20 CAPSULE | Refills: 0 | Status: SHIPPED | OUTPATIENT
Start: 2025-05-09 | End: 2025-05-19

## 2025-05-09 NOTE — PROGRESS NOTES
Subjective:   Erika Javier is a 44 year old female who presents for Sore Throat (Symptoms started on 5/6)     44-year-old female complaining of a sore throat that started 3 days ago and has been worsening since.  2 days ago she noted mild chills.  Has been taking DayQuil and NyQuil without much relief.  Denies any sick contact.  Denies fever, nausea, vomiting, cough, SOB.    History/Other:    Chief Complaint Reviewed and Verified  Nursing Notes Reviewed and   Verified  Tobacco Reviewed  Allergies Reviewed  Medications Reviewed    Problem List Reviewed  Medical History Reviewed  Surgical History   Reviewed  Family History Reviewed  Social History Reviewed         Tobacco:  She smoked tobacco in the past but quit greater than 12 months ago.  Tobacco Use[1]     Current Medications[2]      Review of Systems:  Review of Systems   Constitutional:  Negative for chills, diaphoresis and fever.   HENT:  Positive for sore throat. Negative for congestion, ear discharge, ear pain, sinus pressure and sinus pain.    Eyes:  Negative for pain and discharge.   Respiratory:  Negative for cough, chest tightness, shortness of breath and wheezing.    Cardiovascular:  Negative for chest pain and palpitations.   Gastrointestinal:  Negative for abdominal pain, diarrhea, nausea and vomiting.   Endocrine: Negative for cold intolerance and heat intolerance.   Genitourinary:  Negative for dysuria, flank pain, frequency and urgency.   Musculoskeletal:  Negative for joint swelling.   Skin:  Negative for rash.   Neurological:  Negative for dizziness, syncope and headaches.   Psychiatric/Behavioral:  Negative for confusion and hallucinations.        Objective:   /62 (BP Location: Right arm, Patient Position: Sitting, Cuff Size: adult)   Pulse 83   Temp 98.9 °F (37.2 °C) (Oral)   Ht 5' 9\" (1.753 m)   Wt 200 lb (90.7 kg)   SpO2 97%   BMI 29.53 kg/m²  Estimated body mass index is 29.53 kg/m² as calculated from the  following:    Height as of this encounter: 5' 9\" (1.753 m).    Weight as of this encounter: 200 lb (90.7 kg).  Physical Exam  Constitutional:       General: She is not in acute distress.     Appearance: Normal appearance. She is not ill-appearing or toxic-appearing.   HENT:      Head: Normocephalic and atraumatic.      Right Ear: Tympanic membrane and ear canal normal.      Left Ear: Tympanic membrane and ear canal normal.      Mouth/Throat:      Mouth: Mucous membranes are moist.      Pharynx: Oropharynx is clear. Posterior oropharyngeal erythema (mild) present. No oropharyngeal exudate.   Cardiovascular:      Rate and Rhythm: Normal rate and regular rhythm.      Heart sounds: Normal heart sounds. No murmur heard.     No gallop.   Pulmonary:      Effort: Pulmonary effort is normal. No respiratory distress.      Breath sounds: Normal breath sounds. No stridor. No wheezing, rhonchi or rales.   Abdominal:      General: Bowel sounds are normal.      Palpations: Abdomen is soft.   Musculoskeletal:      Cervical back: Normal range of motion and neck supple. No tenderness.   Skin:     General: Skin is warm and dry.   Neurological:      General: No focal deficit present.      Mental Status: She is alert and oriented to person, place, and time. Mental status is at baseline.   Psychiatric:         Mood and Affect: Mood normal.         Behavior: Behavior normal.         Thought Content: Thought content normal.         Judgment: Judgment normal.       Recent Results (from the past 72 hours)   Strep A Assay W/Optic    Collection Time: 05/09/25  7:45 AM   Result Value Ref Range    Strep Grp A Screen Negetive Negative    Control Line Present with a clear background (yes/no) Yes Yes/No    Kit Lot # [id number] Numeric    Kit Expiration Date 01- Date     Assessment & Plan:   1. Sore throat (Primary)  FeverPAIN Score for Strep Pharyngitis: 2 points. 30-35% isolation of streptococcus.  -Over-the-counter oral analgesics, including  nonsteroidal antiinflammatory drugs (NSAIDs), acetaminophen. Oral analgesics act systemically; thus, they will address concomitant symptoms that may accompany sore throat, such as fever or headache.  -Topical treatments applied locally to the throat via lozenge or spray, or via beverages or foods (eg, ice, tea, soup, and honey).  -Discussed viral versus bacterial component. Antibiotics as a wait-and-see.  -     Strep A Assay W/Optic  -     Amoxicillin; Take 1 capsule (500 mg total) by mouth 2 (two) times daily for 10 days.  Dispense: 20 capsule; Refill: 0  -     Grp A Strep Cult, Throat; Future; Expected date: 05/09/2025          Return if symptoms worsen or fail to improve.    Evens Abreu MD, 5/9/2025, 7:25 AM          [1]   Social History  Tobacco Use   Smoking Status Former   Smokeless Tobacco Never   [2]   Current Outpatient Medications   Medication Sig Dispense Refill    amoxicillin 500 MG Oral Cap Take 1 capsule (500 mg total) by mouth 2 (two) times daily for 10 days. 20 capsule 0    predniSONE 20 MG Oral Tab Prednisone 60mg (3 tabs) by mouth daily x 4 days, then 40mg (2 tabs) daily x 4 days, then 20mg (1 tab) daily x 4 days, then 10mg (1/2 tab) daily x 4 days, then stop. Take with food. 26 tablet 0    montelukast (SINGULAIR) 10 MG Oral Tab Take 1 tablet (10 mg total) by mouth daily. 90 tablet 0    albuterol 108 (90 Base) MCG/ACT Inhalation Aero Soln Inhale 2 puffs into the lungs every 6 (six) hours as needed. 1 each 2    albuterol 108 (90 Base) MCG/ACT Inhalation Aero Soln Inhale 2 puffs into the lungs every 6 (six) hours as needed. 1 each 0    Budesonide-Formoterol Fumarate (SYMBICORT) 80-4.5 MCG/ACT Inhalation Aerosol Inhale 2 puffs into the lungs 2 (two) times daily. 3 each 2

## 2025-06-19 DIAGNOSIS — J45.41 MODERATE PERSISTENT EXTRINSIC ASTHMA WITH ACUTE EXACERBATION (HCC): ICD-10-CM

## 2025-06-23 RX ORDER — MONTELUKAST SODIUM 10 MG/1
10 TABLET ORAL DAILY
Qty: 90 TABLET | Refills: 1 | Status: SHIPPED | OUTPATIENT
Start: 2025-06-23

## 2025-06-23 NOTE — TELEPHONE ENCOUNTER
A refill request was received for:  Requested Prescriptions     Pending Prescriptions Disp Refills    MONTELUKAST 10 MG Oral Tab [Pharmacy Med Name: MONTELUKAST 10MG TABLETS] 90 tablet 3     Sig: TAKE 1 TABLET(10 MG) BY MOUTH DAILY     Last refill date:  1/28/2025  Qty: 90 - No refills  Dx: Asthma  Last office visit: 3/20/2025  When is follow up due: DUE    No future appointments.

## 2025-07-28 ENCOUNTER — TELEMEDICINE (OUTPATIENT)
Dept: FAMILY MEDICINE CLINIC | Facility: CLINIC | Age: 45
End: 2025-07-28
Payer: COMMERCIAL

## 2025-07-28 DIAGNOSIS — J45.41 MODERATE PERSISTENT EXTRINSIC ASTHMA WITH ACUTE EXACERBATION (HCC): Primary | ICD-10-CM

## 2025-07-28 RX ORDER — MONTELUKAST SODIUM 10 MG/1
10 TABLET ORAL DAILY
Qty: 90 TABLET | Refills: 1 | Status: SHIPPED | OUTPATIENT
Start: 2025-07-28

## 2025-07-28 RX ORDER — BUDESONIDE AND FORMOTEROL FUMARATE DIHYDRATE 80; 4.5 UG/1; UG/1
2 AEROSOL RESPIRATORY (INHALATION) 2 TIMES DAILY
Qty: 3 EACH | Refills: 2 | Status: SHIPPED | OUTPATIENT
Start: 2025-07-28

## 2025-07-28 NOTE — PROGRESS NOTES
Due to the real risk of possible exposure to Coronavirus (CoV-2, COVID-19) in the office/medical building and recommendations for social distancing (key to mitigation/limiting the spread of the virus) a Virtual or Telemedicine visit over the phone was performed as below.     Patient has consented to the Virtual/Telephone Check-In service and expresses understanding and accepts financial responsibility for any deductible, co-insurance and/or co-pays associated with this service.    [facility] of [facility]  Telehealth Verbal Consent   I conducted a telehealth visit with Erika Javier today, 07/28/25, which was completed using two-way, real-time interactive audio and video communication. This has been done in good vivian to provide continuity of care in the best interest of the provider-patient relationship, due to the COVID -19 public health crisis/national emergency where restrictions of face-to-face office visits are ongoing. Every conscious effort was taken to allow for sufficient and adequate time to complete the visit.  The patient was made aware of the limitations of the telehealth visit, including treatment limitations as no physical exam could be performed.  The patient was advised to call 911 or to go to the ER in case there was an emergency.  The patient was also advised of the potential privacy & security concerns related to the telehealth platform.   The patient was made aware of where to find [facility]'s notice of privacy practices, telehealth consent form and other related consent forms and documents.  which are located on the [facility] website. The patient verbally agreed to telehealth consent form, related consents and the risks discussed.    Lastly, the patient confirmed that they were in [place].   Included in this visit, time may have been spent reviewing labs, medications, radiology tests and decision making. Appropriate medical decision-making and tests are ordered as detailed in the plan of care  above.  Coding/billing information is submitted for this visit based on complexity of care and/or time spent for the visit.    HPI:  Chief Complaint   Patient presents with    Follow - Up       Erika Javier is a 44 year old female who calls for complaints of:    Follow-up on asthma. She has been using the Singulair nightly and takes Symbicort once a day, usually in the mornings. Feels like this has been working well, has not needed albuterol much lately. No hospitalizations for asthma. Has noticed if she misses a day of Singulair she has more symptoms.     ROS:  Negative except as above.    Physical Exam:  GEN:  Patient is alert, awake and oriented, well developed, well nourished.  LUNGS: Patient speaks clearly in full sentences without dyspnea, no cough while on video visit.  PSYCH: Appropriate mood and affect.    Allergies[1]  Current Medications[2]  Past Medical History[3]  Past Surgical History[4]      ASSESSMENT AND PLAN:   1. Patient is a 44 year old female who calls for: Asthma    Additional Assessment and Plan:  1. Moderate persistent extrinsic asthma with acute exacerbation (HCC)  -Will continue current medications. Recommended to take the Symbicort BID instead of once/day and that may improve symptoms further. Avoid triggers. Albuterol PRN.  -See me 6 months, sooner PRN.  - Budesonide-Formoterol Fumarate (SYMBICORT) 80-4.5 MCG/ACT Inhalation Aerosol; Inhale 2 puffs into the lungs 2 (two) times daily.  Dispense: 3 each; Refill: 2  - montelukast 10 MG Oral Tab; Take 1 tablet (10 mg total) by mouth daily.  Dispense: 90 tablet; Refill: 1        Follow up with me 6 months in-person for physical    Call and/or go to the ER if worsening symptoms including, but not limited to: respiratory distress, shortness of breath and  wheezing, worsening fever, cough and mental status.      The patient (or patient's parent if <19 y/o) indicates understanding of the above recommendations and agrees to the above  plan.    No orders of the defined types were placed in this encounter.      Meds & Refills for this Visit:  Requested Prescriptions     Signed Prescriptions Disp Refills    Budesonide-Formoterol Fumarate (SYMBICORT) 80-4.5 MCG/ACT Inhalation Aerosol 3 each 2     Sig: Inhale 2 puffs into the lungs 2 (two) times daily.    montelukast 10 MG Oral Tab 90 tablet 1     Sig: Take 1 tablet (10 mg total) by mouth daily.       Imaging & Consults:  None    JAIME Rowland     Time spent during encounter: 10 minutes               [1] No Known Allergies  [2]   Current Outpatient Medications   Medication Sig Dispense Refill    Budesonide-Formoterol Fumarate (SYMBICORT) 80-4.5 MCG/ACT Inhalation Aerosol Inhale 2 puffs into the lungs 2 (two) times daily. 3 each 2    montelukast 10 MG Oral Tab Take 1 tablet (10 mg total) by mouth daily. 90 tablet 1    albuterol 108 (90 Base) MCG/ACT Inhalation Aero Soln Inhale 2 puffs into the lungs every 6 (six) hours as needed. 1 each 2   [3]   Past Medical History:   History of chicken pox   [4]   Past Surgical History:  Procedure Laterality Date    Laparoscopic cholecystectomy

## (undated) NOTE — MR AVS SNAPSHOT
After Visit Summary   1/25/2022    Luke Faust   MRN: [id number]           Visit Information     Date & Time  1/25/2022  1:20 PM Provider  James Cabrera [facility], [address] [place] [address],[address], [facility]/[employer].  Ph offer Video Visits through [address] for adult patients for a variety of conditions such as allergies, back pain and cold symptoms?  Skip the drive and waiting room and online chat with a doctor face-to-face using your web-cam enabled computer or mobile devic

## (undated) NOTE — LETTER
AUTHORIZATION FOR SURGICAL OPERATION OR OTHER PROCEDURE    1.  I hereby authorize Dr. Caesar Marcos, and [facility] to my case to perform the following operation and/or procedure [facility]:    ________IUD REMOVAL/REINSERT__ Patient signature:  ___________________________________________________             Relationship to Patient:             Parent    Responsible person                            Spouse  In case of minor or                     Other  _____________   Incompet